# Patient Record
Sex: FEMALE | Race: WHITE | NOT HISPANIC OR LATINO | Employment: OTHER | URBAN - NONMETROPOLITAN AREA
[De-identification: names, ages, dates, MRNs, and addresses within clinical notes are randomized per-mention and may not be internally consistent; named-entity substitution may affect disease eponyms.]

---

## 2017-12-12 ENCOUNTER — TRANSCRIBE ORDERS (OUTPATIENT)
Dept: PODIATRY | Facility: CLINIC | Age: 42
End: 2017-12-12

## 2017-12-12 DIAGNOSIS — L60.0 INGROWN TOENAIL: Primary | ICD-10-CM

## 2017-12-18 ENCOUNTER — OFFICE VISIT (OUTPATIENT)
Dept: PODIATRY | Facility: CLINIC | Age: 42
End: 2017-12-18

## 2017-12-18 VITALS
OXYGEN SATURATION: 95 % | DIASTOLIC BLOOD PRESSURE: 87 MMHG | HEIGHT: 65 IN | HEART RATE: 79 BPM | WEIGHT: 209 LBS | SYSTOLIC BLOOD PRESSURE: 145 MMHG | BODY MASS INDEX: 34.82 KG/M2

## 2017-12-18 DIAGNOSIS — L60.2 ONYCHOGRYPHOSIS: ICD-10-CM

## 2017-12-18 DIAGNOSIS — M79.675 GREAT TOE PAIN, LEFT: Primary | ICD-10-CM

## 2017-12-18 PROCEDURE — 99203 OFFICE O/P NEW LOW 30 MIN: CPT | Performed by: PODIATRIST

## 2017-12-18 PROCEDURE — 11750 EXCISION NAIL&NAIL MATRIX: CPT | Performed by: PODIATRIST

## 2017-12-18 RX ORDER — SERTRALINE HYDROCHLORIDE 25 MG/1
TABLET, FILM COATED ORAL
Status: ON HOLD | COMMUNITY
Start: 2017-12-12 | End: 2018-10-13

## 2017-12-18 RX ORDER — GABAPENTIN 100 MG/1
150 CAPSULE ORAL 3 TIMES DAILY
COMMUNITY
Start: 2017-12-12

## 2017-12-18 RX ORDER — MULTIPLE VITAMINS W/ MINERALS TAB 9MG-400MCG
1 TAB ORAL DAILY
COMMUNITY

## 2017-12-18 RX ORDER — DANTROLENE SODIUM 50 MG/1
CAPSULE ORAL
COMMUNITY
Start: 2017-12-14

## 2017-12-18 RX ORDER — ROPINIROLE 2 MG/1
TABLET, FILM COATED ORAL
COMMUNITY
Start: 2017-12-13

## 2017-12-18 NOTE — PROGRESS NOTES
Lucy Ross  1975  42 y.o. female     Patient presents today with a complaint of a painful toenail on her left great toe.    2017  Chief Complaint   Patient presents with   • Left Foot - Ingrown Toenail           History of Present Illness    Lucy Ross is a 42 y.o.female who presents to clinic today chief complaint of left great toe pain.  Pain is been present for several months.  It is progressively getting worse.  She rates the pain as a 5 out of 10 at its worst.  She describes it as sharp.  Pain is aggravated with closed shoes.  She has done nothing to treat it.  She denies any injuries or trauma.  She has no other pedal complaints.          Past Medical History:   Diagnosis Date   • Fibromyalgia    • Ingrown toenail    • Seizures          Past Surgical History:   Procedure Laterality Date   •  SECTION     • CHOLECYSTECTOMY     • ENDOMETRIAL ABLATION     • MOUTH SURGERY           Family History   Problem Relation Age of Onset   • Adopted: Yes   • Family history unknown: Yes       Allergies   Allergen Reactions   • Lisinopril    • Prednisone        Social History     Social History   • Marital status:      Spouse name: N/A   • Number of children: N/A   • Years of education: N/A     Occupational History   • Not on file.     Social History Main Topics   • Smoking status: Current Every Day Smoker     Types: Cigarettes, Electronic Cigarette   • Smokeless tobacco: Never Used   • Alcohol use No   • Drug use: Defer   • Sexual activity: Defer     Other Topics Concern   • Not on file     Social History Narrative   • No narrative on file         Current Outpatient Prescriptions   Medication Sig Dispense Refill   • dantrolene (DANTRIUM) 50 MG capsule      • gabapentin (NEURONTIN) 100 MG capsule      • Multiple Vitamins-Minerals (MULTIVITAMIN WITH MINERALS) tablet tablet Take 1 tablet by mouth Daily.     • rOPINIRole (REQUIP) 2 MG tablet      • sertraline (ZOLOFT) 25 MG tablet        No  "current facility-administered medications for this visit.          OBJECTIVE    /87  Pulse 79  Ht 163.8 cm (64.5\")  Wt 94.8 kg (209 lb)  SpO2 95%  BMI 35.32 kg/m2      Review of Systems   Constitutional: Negative.    Eyes: Negative.    Respiratory: Negative.    Cardiovascular: Positive for leg swelling.   Gastrointestinal: Positive for nausea.   Endocrine: Negative.    Genitourinary: Negative.    Musculoskeletal: Positive for arthralgias, back pain and joint swelling.        Left great toenail pain  Joint pain   Skin: Negative.    Allergic/Immunologic: Negative.    Neurological: Positive for dizziness, numbness and headaches.   Hematological: Negative.    Psychiatric/Behavioral: The patient is nervous/anxious.         Depression           Constitutional: well developed, well nourished    HEENT: Normocephalic and atraumatic, normal hearing    Respiratory: Non labored respirations noted    Cardiovascular:    DP/PT pulses palpable    CFT brisk  to all digits  Skin temp is warm to warm from proximal tibia to distal digits  Pedal hair growth present.   No erythema or edema noted     Musculoskeletal:  Muscle strength is 5/5 for all muscle groups tested   ROM of the 1st MTP is full without pain or crepitus   ROM of the ankle joint is full without pain or crepitus        Dermatological:   Left hallux nail is incurvated on the medial and lateral border.  It is thickened.  It is very tender to palpation.  Skin is warm, dry and intact    Webspaces 1-4 bilateral are clean, dry and intact.   No subcutaneous nodules or masses noted      Neurological:   Sensation intact to light touch    DTR intact    Psychiatric: A&O x 3 with normal mood and affect. NAD.         Nail Removal  Date/Time: 12/18/2017 11:20 AM  Performed by: LUNA WOODS  Authorized by: LUNA WOODS   Consent: Verbal consent obtained. Written consent obtained.  Risks and benefits: risks, benefits and alternatives were discussed  Consent given by: " patient  Patient understanding: patient states understanding of the procedure being performed  Patient identity confirmed: verbally with patient  Location: left foot  Location details: left big toe  Anesthesia: digital block    Anesthesia:  Local Anesthetic: lidocaine 2% without epinephrine  Preparation: skin prepped with Betadine  Amount removed: complete (Nail plate was  from underlying nailbed with blunt dissection and removed with a hemostat)  Nail matrix removed: complete (Phenol)  Dressing: antibiotic ointment and dressing applied  Patient tolerance: Patient tolerated the procedure well with no immediate complications              ASSESSMENT AND PLAN    Lucy was seen today for ingrown toenail.    Diagnoses and all orders for this visit:    Great toe pain, left    Onychogryphosis    - Comprehensive foot and ankle exam performed  -  Treatment of gryphotic toenails was discussed with patient, including risks and potential benefits of nail avulsion both temporary and permanent versus simple debridement.  - Patient elected for a total permanent nail avulsion  - Dispensed aftercare instruction sheet  - All questions were answered and the patient is in agreement with the current treatment plan.  - RTC in 2 weeks          This document has been electronically signed by Wan Mckeon DPM on December 18, 2017 11:17 AM     12/18/2017  11:17 AM

## 2018-10-13 ENCOUNTER — APPOINTMENT (OUTPATIENT)
Dept: CT IMAGING | Facility: HOSPITAL | Age: 43
End: 2018-10-13

## 2018-10-13 ENCOUNTER — HOSPITAL ENCOUNTER (INPATIENT)
Facility: HOSPITAL | Age: 43
LOS: 1 days | Discharge: HOME | End: 2018-10-14
Attending: EMERGENCY MEDICINE | Admitting: OBSTETRICS & GYNECOLOGY

## 2018-10-13 ENCOUNTER — ANESTHESIA (OUTPATIENT)
Dept: PERIOP | Facility: HOSPITAL | Age: 43
End: 2018-10-13

## 2018-10-13 ENCOUNTER — ANESTHESIA EVENT (OUTPATIENT)
Dept: PERIOP | Facility: HOSPITAL | Age: 43
End: 2018-10-13

## 2018-10-13 DIAGNOSIS — R10.2 SUPRAPUBIC PAIN: ICD-10-CM

## 2018-10-13 DIAGNOSIS — F17.200 SMOKER: ICD-10-CM

## 2018-10-13 DIAGNOSIS — N83.209 CYST OF OVARY, UNSPECIFIED LATERALITY: Primary | ICD-10-CM

## 2018-10-13 DIAGNOSIS — E66.9 OBESITY, CLASS II, BMI 35-39.9: ICD-10-CM

## 2018-10-13 LAB
ALBUMIN SERPL-MCNC: 4.3 G/DL (ref 3.4–4.8)
ALBUMIN/GLOB SERPL: 1.2 G/DL (ref 1.1–1.8)
ALP SERPL-CCNC: 53 U/L (ref 38–126)
ALT SERPL W P-5'-P-CCNC: 17 U/L (ref 9–52)
ANION GAP SERPL CALCULATED.3IONS-SCNC: 15 MMOL/L (ref 5–15)
AST SERPL-CCNC: 27 U/L (ref 14–36)
BACTERIA UR QL AUTO: ABNORMAL /HPF
BASOPHILS # BLD AUTO: 0.03 10*3/MM3 (ref 0–0.2)
BASOPHILS NFR BLD AUTO: 0.3 % (ref 0–2)
BILIRUB SERPL-MCNC: 0.4 MG/DL (ref 0.2–1.3)
BILIRUB UR QL STRIP: NEGATIVE
BUN BLD-MCNC: 11 MG/DL (ref 7–21)
BUN/CREAT SERPL: 21.2 (ref 7–25)
CALCIUM SPEC-SCNC: 9.3 MG/DL (ref 8.4–10.2)
CHLORIDE SERPL-SCNC: 104 MMOL/L (ref 95–110)
CLARITY UR: ABNORMAL
CO2 SERPL-SCNC: 19 MMOL/L (ref 22–31)
COLOR UR: YELLOW
CREAT BLD-MCNC: 0.52 MG/DL (ref 0.5–1)
DEPRECATED RDW RBC AUTO: 41.2 FL (ref 36.4–46.3)
EOSINOPHIL # BLD AUTO: 0.27 10*3/MM3 (ref 0–0.7)
EOSINOPHIL NFR BLD AUTO: 2.3 % (ref 0–7)
ERYTHROCYTE [DISTWIDTH] IN BLOOD BY AUTOMATED COUNT: 12.2 % (ref 11.5–14.5)
GFR SERPL CREATININE-BSD FRML MDRD: 129 ML/MIN/1.73 (ref 58–135)
GLOBULIN UR ELPH-MCNC: 3.6 GM/DL (ref 2.3–3.5)
GLUCOSE BLD-MCNC: 155 MG/DL (ref 60–100)
GLUCOSE UR STRIP-MCNC: NEGATIVE MG/DL
HCG SERPL QL: NEGATIVE
HCT VFR BLD AUTO: 44.4 % (ref 35–45)
HGB BLD-MCNC: 15.6 G/DL (ref 12–15.5)
HGB UR QL STRIP.AUTO: NEGATIVE
HYALINE CASTS UR QL AUTO: ABNORMAL /LPF
IMM GRANULOCYTES # BLD: 0.06 10*3/MM3 (ref 0–0.02)
IMM GRANULOCYTES NFR BLD: 0.5 % (ref 0–0.5)
KETONES UR QL STRIP: NEGATIVE
LEUKOCYTE ESTERASE UR QL STRIP.AUTO: ABNORMAL
LYMPHOCYTES # BLD AUTO: 1.74 10*3/MM3 (ref 0.6–4.2)
LYMPHOCYTES NFR BLD AUTO: 14.8 % (ref 10–50)
MCH RBC QN AUTO: 32.2 PG (ref 26.5–34)
MCHC RBC AUTO-ENTMCNC: 35.1 G/DL (ref 31.4–36)
MCV RBC AUTO: 91.7 FL (ref 80–98)
MONOCYTES # BLD AUTO: 0.79 10*3/MM3 (ref 0–0.9)
MONOCYTES NFR BLD AUTO: 6.7 % (ref 0–12)
NEUTROPHILS # BLD AUTO: 8.89 10*3/MM3 (ref 2–8.6)
NEUTROPHILS NFR BLD AUTO: 75.4 % (ref 37–80)
NITRITE UR QL STRIP: NEGATIVE
PH UR STRIP.AUTO: <=5 [PH] (ref 5–9)
PLATELET # BLD AUTO: 197 10*3/MM3 (ref 150–450)
PMV BLD AUTO: 10.7 FL (ref 8–12)
POTASSIUM BLD-SCNC: 4 MMOL/L (ref 3.5–5.1)
PROT SERPL-MCNC: 7.9 G/DL (ref 6.3–8.6)
PROT UR QL STRIP: NEGATIVE
RBC # BLD AUTO: 4.84 10*6/MM3 (ref 3.77–5.16)
RBC # UR: ABNORMAL /HPF
REF LAB TEST METHOD: ABNORMAL
SODIUM BLD-SCNC: 138 MMOL/L (ref 137–145)
SP GR UR STRIP: 1.02 (ref 1–1.03)
SQUAMOUS #/AREA URNS HPF: ABNORMAL /HPF
UROBILINOGEN UR QL STRIP: ABNORMAL
WBC NRBC COR # BLD: 11.78 10*3/MM3 (ref 3.2–9.8)
WBC UR QL AUTO: ABNORMAL /HPF
WHOLE BLOOD HOLD SPECIMEN: NORMAL

## 2018-10-13 PROCEDURE — G0378 HOSPITAL OBSERVATION PER HR: HCPCS

## 2018-10-13 PROCEDURE — 25010000002 ESTRADIOL VALERATE PER 10 MG: Performed by: OBSTETRICS & GYNECOLOGY

## 2018-10-13 PROCEDURE — 25010000002 FENTANYL CITRATE (PF) 100 MCG/2ML SOLUTION: Performed by: NURSE ANESTHETIST, CERTIFIED REGISTERED

## 2018-10-13 PROCEDURE — 25010000002 NEOSTIGMINE 4 MG/4ML SOLUTION PREFILLED SYRINGE: Performed by: NURSE ANESTHETIST, CERTIFIED REGISTERED

## 2018-10-13 PROCEDURE — 25010000002 IOPAMIDOL 61 % SOLUTION: Performed by: EMERGENCY MEDICINE

## 2018-10-13 PROCEDURE — 0UT90ZZ RESECTION OF UTERUS, OPEN APPROACH: ICD-10-PCS | Performed by: OBSTETRICS & GYNECOLOGY

## 2018-10-13 PROCEDURE — 88304 TISSUE EXAM BY PATHOLOGIST: CPT | Performed by: PATHOLOGY

## 2018-10-13 PROCEDURE — 58150 TOTAL HYSTERECTOMY: CPT | Performed by: OBSTETRICS & GYNECOLOGY

## 2018-10-13 PROCEDURE — 25010000002 TESTOSTERONE CYPIONATE 200 MG/ML SOLUTION: Performed by: OBSTETRICS & GYNECOLOGY

## 2018-10-13 PROCEDURE — 99223 1ST HOSP IP/OBS HIGH 75: CPT | Performed by: OBSTETRICS & GYNECOLOGY

## 2018-10-13 PROCEDURE — 25010000002 ONDANSETRON PER 1 MG: Performed by: NURSE ANESTHETIST, CERTIFIED REGISTERED

## 2018-10-13 PROCEDURE — 25010000002 METHOCARBAMOL 1000 MG/10ML SOLUTION 10 ML VIAL: Performed by: OBSTETRICS & GYNECOLOGY

## 2018-10-13 PROCEDURE — 85025 COMPLETE CBC W/AUTO DIFF WBC: CPT | Performed by: EMERGENCY MEDICINE

## 2018-10-13 PROCEDURE — 99284 EMERGENCY DEPT VISIT MOD MDM: CPT

## 2018-10-13 PROCEDURE — 25010000002 SUCCINYLCHOLINE PER 20 MG: Performed by: NURSE ANESTHETIST, CERTIFIED REGISTERED

## 2018-10-13 PROCEDURE — 25010000002 HYDROMORPHONE 1 MG/ML SOLUTION: Performed by: ANESTHESIOLOGY

## 2018-10-13 PROCEDURE — 0DTJ0ZZ RESECTION OF APPENDIX, OPEN APPROACH: ICD-10-PCS | Performed by: OBSTETRICS & GYNECOLOGY

## 2018-10-13 PROCEDURE — 81001 URINALYSIS AUTO W/SCOPE: CPT | Performed by: EMERGENCY MEDICINE

## 2018-10-13 PROCEDURE — 25010000002 ONDANSETRON PER 1 MG: Performed by: EMERGENCY MEDICINE

## 2018-10-13 PROCEDURE — 88112 CYTOPATH CELL ENHANCE TECH: CPT | Performed by: PATHOLOGY

## 2018-10-13 PROCEDURE — 88112 CYTOPATH CELL ENHANCE TECH: CPT | Performed by: OBSTETRICS & GYNECOLOGY

## 2018-10-13 PROCEDURE — 88305 TISSUE EXAM BY PATHOLOGIST: CPT | Performed by: OBSTETRICS & GYNECOLOGY

## 2018-10-13 PROCEDURE — 25010000002 DEXAMETHASONE PER 1 MG: Performed by: NURSE ANESTHETIST, CERTIFIED REGISTERED

## 2018-10-13 PROCEDURE — 74177 CT ABD & PELVIS W/CONTRAST: CPT

## 2018-10-13 PROCEDURE — 25010000002 HYDROMORPHONE 1 MG/ML SOLUTION: Performed by: OBSTETRICS & GYNECOLOGY

## 2018-10-13 PROCEDURE — 25010000002 ONDANSETRON PER 1 MG: Performed by: OBSTETRICS & GYNECOLOGY

## 2018-10-13 PROCEDURE — 94799 UNLISTED PULMONARY SVC/PX: CPT

## 2018-10-13 PROCEDURE — 88307 TISSUE EXAM BY PATHOLOGIST: CPT | Performed by: PATHOLOGY

## 2018-10-13 PROCEDURE — 88307 TISSUE EXAM BY PATHOLOGIST: CPT | Performed by: OBSTETRICS & GYNECOLOGY

## 2018-10-13 PROCEDURE — 0UT20ZZ RESECTION OF BILATERAL OVARIES, OPEN APPROACH: ICD-10-PCS | Performed by: OBSTETRICS & GYNECOLOGY

## 2018-10-13 PROCEDURE — 82378 CARCINOEMBRYONIC ANTIGEN: CPT | Performed by: GENERAL PRACTICE

## 2018-10-13 PROCEDURE — 25010000002 PROPOFOL 10 MG/ML EMULSION: Performed by: NURSE ANESTHETIST, CERTIFIED REGISTERED

## 2018-10-13 PROCEDURE — 25010000002 ONDANSETRON PER 1 MG: Performed by: ANESTHESIOLOGY

## 2018-10-13 PROCEDURE — 25010000002 KETOROLAC TROMETHAMINE PER 15 MG: Performed by: OBSTETRICS & GYNECOLOGY

## 2018-10-13 PROCEDURE — 25010000002 HYDROMORPHONE 1 MG/ML SOLUTION

## 2018-10-13 PROCEDURE — 86304 IMMUNOASSAY TUMOR CA 125: CPT | Performed by: GENERAL PRACTICE

## 2018-10-13 PROCEDURE — 25010000002 KETOROLAC TROMETHAMINE PER 15 MG: Performed by: NURSE ANESTHETIST, CERTIFIED REGISTERED

## 2018-10-13 PROCEDURE — 25010000002 MIDAZOLAM PER 1 MG: Performed by: NURSE ANESTHETIST, CERTIFIED REGISTERED

## 2018-10-13 PROCEDURE — 25010000002 ONDANSETRON PER 1 MG: Performed by: GENERAL PRACTICE

## 2018-10-13 PROCEDURE — 84703 CHORIONIC GONADOTROPIN ASSAY: CPT | Performed by: EMERGENCY MEDICINE

## 2018-10-13 PROCEDURE — 0UT70ZZ RESECTION OF BILATERAL FALLOPIAN TUBES, OPEN APPROACH: ICD-10-PCS | Performed by: OBSTETRICS & GYNECOLOGY

## 2018-10-13 PROCEDURE — 88304 TISSUE EXAM BY PATHOLOGIST: CPT | Performed by: OBSTETRICS & GYNECOLOGY

## 2018-10-13 PROCEDURE — 80053 COMPREHEN METABOLIC PANEL: CPT | Performed by: EMERGENCY MEDICINE

## 2018-10-13 RX ORDER — DEXTROSE AND SODIUM CHLORIDE 5; .45 G/100ML; G/100ML
125 INJECTION, SOLUTION INTRAVENOUS CONTINUOUS
Status: DISCONTINUED | OUTPATIENT
Start: 2018-10-13 | End: 2018-10-14 | Stop reason: HOSPADM

## 2018-10-13 RX ORDER — KETOROLAC TROMETHAMINE 30 MG/ML
30 INJECTION, SOLUTION INTRAMUSCULAR; INTRAVENOUS EVERY 6 HOURS
Status: DISCONTINUED | OUTPATIENT
Start: 2018-10-13 | End: 2018-10-14 | Stop reason: HOSPADM

## 2018-10-13 RX ORDER — FLUMAZENIL 0.1 MG/ML
0.2 INJECTION INTRAVENOUS AS NEEDED
Status: CANCELLED | OUTPATIENT
Start: 2018-10-13

## 2018-10-13 RX ORDER — ONDANSETRON 4 MG/1
4 TABLET, FILM COATED ORAL EVERY 6 HOURS PRN
Status: DISCONTINUED | OUTPATIENT
Start: 2018-10-13 | End: 2018-10-14 | Stop reason: HOSPADM

## 2018-10-13 RX ORDER — DIPHENHYDRAMINE HYDROCHLORIDE 50 MG/ML
12.5 INJECTION INTRAMUSCULAR; INTRAVENOUS
Status: DISCONTINUED | OUTPATIENT
Start: 2018-10-13 | End: 2018-10-13 | Stop reason: HOSPADM

## 2018-10-13 RX ORDER — LABETALOL HYDROCHLORIDE 5 MG/ML
5 INJECTION, SOLUTION INTRAVENOUS
Status: CANCELLED | OUTPATIENT
Start: 2018-10-13

## 2018-10-13 RX ORDER — ACETAMINOPHEN 650 MG/1
650 SUPPOSITORY RECTAL ONCE AS NEEDED
Status: CANCELLED | OUTPATIENT
Start: 2018-10-13

## 2018-10-13 RX ORDER — SODIUM CHLORIDE, SODIUM LACTATE, POTASSIUM CHLORIDE, CALCIUM CHLORIDE 600; 310; 30; 20 MG/100ML; MG/100ML; MG/100ML; MG/100ML
125 INJECTION, SOLUTION INTRAVENOUS CONTINUOUS
Status: DISCONTINUED | OUTPATIENT
Start: 2018-10-13 | End: 2018-10-13 | Stop reason: HOSPADM

## 2018-10-13 RX ORDER — CELECOXIB 200 MG/1
400 CAPSULE ORAL ONCE
Status: COMPLETED | OUTPATIENT
Start: 2018-10-13 | End: 2018-10-13

## 2018-10-13 RX ORDER — BISACODYL 10 MG
10 SUPPOSITORY, RECTAL RECTAL DAILY PRN
Status: DISCONTINUED | OUTPATIENT
Start: 2018-10-13 | End: 2018-10-14 | Stop reason: HOSPADM

## 2018-10-13 RX ORDER — NICOTINE 21 MG/24HR
1 PATCH, TRANSDERMAL 24 HOURS TRANSDERMAL EVERY 24 HOURS
Qty: 30 EACH | Refills: 12 | Status: SHIPPED | OUTPATIENT
Start: 2018-10-13

## 2018-10-13 RX ORDER — IBUPROFEN 800 MG/1
800 TABLET ORAL
Status: DISCONTINUED | OUTPATIENT
Start: 2018-10-14 | End: 2018-10-14 | Stop reason: HOSPADM

## 2018-10-13 RX ORDER — ACETAMINOPHEN 325 MG/1
650 TABLET ORAL ONCE AS NEEDED
Status: DISCONTINUED | OUTPATIENT
Start: 2018-10-13 | End: 2018-10-13 | Stop reason: HOSPADM

## 2018-10-13 RX ORDER — ONDANSETRON 2 MG/ML
4 INJECTION INTRAMUSCULAR; INTRAVENOUS EVERY 6 HOURS PRN
Status: DISCONTINUED | OUTPATIENT
Start: 2018-10-13 | End: 2018-10-14 | Stop reason: HOSPADM

## 2018-10-13 RX ORDER — DEXAMETHASONE SODIUM PHOSPHATE 4 MG/ML
INJECTION, SOLUTION INTRA-ARTICULAR; INTRALESIONAL; INTRAMUSCULAR; INTRAVENOUS; SOFT TISSUE AS NEEDED
Status: DISCONTINUED | OUTPATIENT
Start: 2018-10-13 | End: 2018-10-13 | Stop reason: SURG

## 2018-10-13 RX ORDER — ONDANSETRON 2 MG/ML
8 INJECTION INTRAMUSCULAR; INTRAVENOUS ONCE
Status: DISCONTINUED | OUTPATIENT
Start: 2018-10-13 | End: 2018-10-13 | Stop reason: CLARIF

## 2018-10-13 RX ORDER — HYDROCODONE BITARTRATE AND ACETAMINOPHEN 7.5; 325 MG/1; MG/1
1 TABLET ORAL ONCE
Status: COMPLETED | OUTPATIENT
Start: 2018-10-13 | End: 2018-10-13

## 2018-10-13 RX ORDER — NALOXONE HCL 0.4 MG/ML
0.2 VIAL (ML) INJECTION AS NEEDED
Status: DISCONTINUED | OUTPATIENT
Start: 2018-10-13 | End: 2018-10-13 | Stop reason: HOSPADM

## 2018-10-13 RX ORDER — MIDAZOLAM HYDROCHLORIDE 1 MG/ML
INJECTION INTRAMUSCULAR; INTRAVENOUS AS NEEDED
Status: DISCONTINUED | OUTPATIENT
Start: 2018-10-13 | End: 2018-10-13 | Stop reason: SURG

## 2018-10-13 RX ORDER — SODIUM CHLORIDE 9 MG/ML
INJECTION, SOLUTION INTRAVENOUS CONTINUOUS PRN
Status: DISCONTINUED | OUTPATIENT
Start: 2018-10-13 | End: 2018-10-13 | Stop reason: SURG

## 2018-10-13 RX ORDER — FLUMAZENIL 0.1 MG/ML
0.2 INJECTION INTRAVENOUS AS NEEDED
Status: DISCONTINUED | OUTPATIENT
Start: 2018-10-13 | End: 2018-10-13 | Stop reason: HOSPADM

## 2018-10-13 RX ORDER — SUCCINYLCHOLINE CHLORIDE 20 MG/ML
INJECTION INTRAMUSCULAR; INTRAVENOUS AS NEEDED
Status: DISCONTINUED | OUTPATIENT
Start: 2018-10-13 | End: 2018-10-13 | Stop reason: SURG

## 2018-10-13 RX ORDER — ONDANSETRON 2 MG/ML
INJECTION INTRAMUSCULAR; INTRAVENOUS AS NEEDED
Status: DISCONTINUED | OUTPATIENT
Start: 2018-10-13 | End: 2018-10-13 | Stop reason: SURG

## 2018-10-13 RX ORDER — HYDROCODONE BITARTRATE AND ACETAMINOPHEN 5; 325 MG/1; MG/1
1 TABLET ORAL EVERY 4 HOURS PRN
Qty: 40 TABLET | Refills: 0 | Status: SHIPPED | OUTPATIENT
Start: 2018-10-13

## 2018-10-13 RX ORDER — DOCUSATE SODIUM 100 MG/1
100 CAPSULE, LIQUID FILLED ORAL 2 TIMES DAILY
Status: DISCONTINUED | OUTPATIENT
Start: 2018-10-13 | End: 2018-10-14 | Stop reason: HOSPADM

## 2018-10-13 RX ORDER — NICOTINE 21 MG/24HR
1 PATCH, TRANSDERMAL 24 HOURS TRANSDERMAL
Status: DISCONTINUED | OUTPATIENT
Start: 2018-10-14 | End: 2018-10-14 | Stop reason: HOSPADM

## 2018-10-13 RX ORDER — NICOTINE 21 MG/24HR
1 PATCH, TRANSDERMAL 24 HOURS TRANSDERMAL
Status: DISCONTINUED | OUTPATIENT
Start: 2018-10-13 | End: 2018-10-14 | Stop reason: HOSPADM

## 2018-10-13 RX ORDER — ROCURONIUM BROMIDE 10 MG/ML
INJECTION, SOLUTION INTRAVENOUS AS NEEDED
Status: DISCONTINUED | OUTPATIENT
Start: 2018-10-13 | End: 2018-10-13 | Stop reason: SURG

## 2018-10-13 RX ORDER — ONDANSETRON 4 MG/1
4 TABLET, ORALLY DISINTEGRATING ORAL EVERY 6 HOURS PRN
Status: DISCONTINUED | OUTPATIENT
Start: 2018-10-13 | End: 2018-10-14 | Stop reason: HOSPADM

## 2018-10-13 RX ORDER — LIDOCAINE HYDROCHLORIDE 20 MG/ML
INJECTION, SOLUTION INFILTRATION; PERINEURAL AS NEEDED
Status: DISCONTINUED | OUTPATIENT
Start: 2018-10-13 | End: 2018-10-13 | Stop reason: SURG

## 2018-10-13 RX ORDER — SENNA AND DOCUSATE SODIUM 50; 8.6 MG/1; MG/1
2 TABLET, FILM COATED ORAL 2 TIMES DAILY PRN
Status: DISCONTINUED | OUTPATIENT
Start: 2018-10-13 | End: 2018-10-14 | Stop reason: HOSPADM

## 2018-10-13 RX ORDER — PROMETHAZINE HYDROCHLORIDE 25 MG/1
25 TABLET ORAL EVERY 6 HOURS PRN
Qty: 60 TABLET | Refills: 12 | Status: SHIPPED | OUTPATIENT
Start: 2018-10-13 | End: 2018-10-23 | Stop reason: HOSPADM

## 2018-10-13 RX ORDER — MEPERIDINE HYDROCHLORIDE 50 MG/ML
12.5 INJECTION INTRAMUSCULAR; INTRAVENOUS; SUBCUTANEOUS
Status: DISCONTINUED | OUTPATIENT
Start: 2018-10-13 | End: 2018-10-13 | Stop reason: HOSPADM

## 2018-10-13 RX ORDER — NEOSTIGMINE METHYLSULFATE 4 MG/4 ML
SYRINGE (ML) INTRAVENOUS AS NEEDED
Status: DISCONTINUED | OUTPATIENT
Start: 2018-10-13 | End: 2018-10-13 | Stop reason: SURG

## 2018-10-13 RX ORDER — ESTRADIOL VALERATE 40 MG/ML
20 INJECTION INTRAMUSCULAR ONCE
Status: COMPLETED | OUTPATIENT
Start: 2018-10-13 | End: 2018-10-13

## 2018-10-13 RX ORDER — MEPERIDINE HYDROCHLORIDE 50 MG/ML
12.5 INJECTION INTRAMUSCULAR; INTRAVENOUS; SUBCUTANEOUS
Status: CANCELLED | OUTPATIENT
Start: 2018-10-13 | End: 2018-10-14

## 2018-10-13 RX ORDER — SIMETHICONE 80 MG
80 TABLET,CHEWABLE ORAL 4 TIMES DAILY PRN
Status: DISCONTINUED | OUTPATIENT
Start: 2018-10-13 | End: 2018-10-14 | Stop reason: HOSPADM

## 2018-10-13 RX ORDER — ROPINIROLE 1 MG/1
2 TABLET, FILM COATED ORAL NIGHTLY
Status: DISCONTINUED | OUTPATIENT
Start: 2018-10-13 | End: 2018-10-14 | Stop reason: HOSPADM

## 2018-10-13 RX ORDER — SODIUM CHLORIDE 0.9 % (FLUSH) 0.9 %
1-10 SYRINGE (ML) INJECTION AS NEEDED
Status: DISCONTINUED | OUTPATIENT
Start: 2018-10-13 | End: 2018-10-13 | Stop reason: HOSPADM

## 2018-10-13 RX ORDER — CELECOXIB 200 MG/1
CAPSULE ORAL
Status: DISPENSED
Start: 2018-10-13 | End: 2018-10-14

## 2018-10-13 RX ORDER — ZOLPIDEM TARTRATE 5 MG/1
10 TABLET ORAL NIGHTLY PRN
Status: DISCONTINUED | OUTPATIENT
Start: 2018-10-13 | End: 2018-10-14 | Stop reason: HOSPADM

## 2018-10-13 RX ORDER — HYDROCODONE BITARTRATE AND ACETAMINOPHEN 10; 325 MG/1; MG/1
1 TABLET ORAL EVERY 4 HOURS PRN
Status: DISCONTINUED | OUTPATIENT
Start: 2018-10-13 | End: 2018-10-14 | Stop reason: HOSPADM

## 2018-10-13 RX ORDER — ONDANSETRON 2 MG/ML
4 INJECTION INTRAMUSCULAR; INTRAVENOUS ONCE AS NEEDED
Status: CANCELLED | OUTPATIENT
Start: 2018-10-13

## 2018-10-13 RX ORDER — EPHEDRINE SULFATE 50 MG/ML
5 INJECTION, SOLUTION INTRAVENOUS ONCE AS NEEDED
Status: DISCONTINUED | OUTPATIENT
Start: 2018-10-13 | End: 2018-10-13 | Stop reason: HOSPADM

## 2018-10-13 RX ORDER — GLYCOPYRROLATE 0.2 MG/ML
INJECTION INTRAMUSCULAR; INTRAVENOUS AS NEEDED
Status: DISCONTINUED | OUTPATIENT
Start: 2018-10-13 | End: 2018-10-13 | Stop reason: SURG

## 2018-10-13 RX ORDER — ONDANSETRON HYDROCHLORIDE 8 MG/1
8 TABLET, FILM COATED ORAL EVERY 8 HOURS PRN
Qty: 30 TABLET | Refills: 12 | Status: SHIPPED | OUTPATIENT
Start: 2018-10-13

## 2018-10-13 RX ORDER — ACETAMINOPHEN 650 MG/1
650 SUPPOSITORY RECTAL ONCE AS NEEDED
Status: DISCONTINUED | OUTPATIENT
Start: 2018-10-13 | End: 2018-10-13 | Stop reason: HOSPADM

## 2018-10-13 RX ORDER — EPHEDRINE SULFATE 50 MG/ML
5 INJECTION, SOLUTION INTRAVENOUS ONCE AS NEEDED
Status: CANCELLED | OUTPATIENT
Start: 2018-10-13

## 2018-10-13 RX ORDER — DIPHENHYDRAMINE HYDROCHLORIDE 50 MG/ML
12.5 INJECTION INTRAMUSCULAR; INTRAVENOUS
Status: CANCELLED | OUTPATIENT
Start: 2018-10-13

## 2018-10-13 RX ORDER — ONDANSETRON 2 MG/ML
4 INJECTION INTRAMUSCULAR; INTRAVENOUS ONCE
Status: COMPLETED | OUTPATIENT
Start: 2018-10-13 | End: 2018-10-13

## 2018-10-13 RX ORDER — TESTOSTERONE CYPIONATE 200 MG/ML
100 INJECTION, SOLUTION INTRAMUSCULAR ONCE
Status: COMPLETED | OUTPATIENT
Start: 2018-10-13 | End: 2018-10-13

## 2018-10-13 RX ORDER — IBUPROFEN 800 MG/1
800 TABLET ORAL EVERY 8 HOURS PRN
Qty: 60 TABLET | Refills: 12 | Status: SHIPPED | OUTPATIENT
Start: 2018-10-13

## 2018-10-13 RX ORDER — GABAPENTIN 100 MG/1
200 CAPSULE ORAL 3 TIMES DAILY
Status: DISCONTINUED | OUTPATIENT
Start: 2018-10-13 | End: 2018-10-14 | Stop reason: HOSPADM

## 2018-10-13 RX ORDER — KETOROLAC TROMETHAMINE 30 MG/ML
INJECTION, SOLUTION INTRAMUSCULAR; INTRAVENOUS AS NEEDED
Status: DISCONTINUED | OUTPATIENT
Start: 2018-10-13 | End: 2018-10-13 | Stop reason: SURG

## 2018-10-13 RX ORDER — BUPIVACAINE HCL/0.9 % NACL/PF 0.1 %
2 PLASTIC BAG, INJECTION (ML) EPIDURAL ONCE
Status: COMPLETED | OUTPATIENT
Start: 2018-10-13 | End: 2018-10-13

## 2018-10-13 RX ORDER — METOPROLOL TARTRATE 5 MG/5ML
INJECTION INTRAVENOUS AS NEEDED
Status: DISCONTINUED | OUTPATIENT
Start: 2018-10-13 | End: 2018-10-13 | Stop reason: SURG

## 2018-10-13 RX ORDER — DANTROLENE SODIUM 25 MG/1
50 CAPSULE ORAL DAILY
Status: DISCONTINUED | OUTPATIENT
Start: 2018-10-14 | End: 2018-10-14 | Stop reason: HOSPADM

## 2018-10-13 RX ORDER — BUPIVACAINE HCL/0.9 % NACL/PF 0.1 %
2 PLASTIC BAG, INJECTION (ML) EPIDURAL EVERY 8 HOURS
Status: DISCONTINUED | OUTPATIENT
Start: 2018-10-14 | End: 2018-10-14 | Stop reason: HOSPADM

## 2018-10-13 RX ORDER — PROPOFOL 10 MG/ML
VIAL (ML) INTRAVENOUS AS NEEDED
Status: DISCONTINUED | OUTPATIENT
Start: 2018-10-13 | End: 2018-10-13 | Stop reason: SURG

## 2018-10-13 RX ORDER — CELECOXIB 200 MG/1
CAPSULE ORAL
Status: COMPLETED
Start: 2018-10-13 | End: 2018-10-13

## 2018-10-13 RX ORDER — ONDANSETRON 2 MG/ML
4 INJECTION INTRAMUSCULAR; INTRAVENOUS ONCE AS NEEDED
Status: COMPLETED | OUTPATIENT
Start: 2018-10-13 | End: 2018-10-13

## 2018-10-13 RX ORDER — BUPROPION HYDROCHLORIDE 150 MG/1
150 TABLET, EXTENDED RELEASE ORAL 2 TIMES DAILY
Qty: 60 TABLET | Refills: 11 | Status: SHIPPED | OUTPATIENT
Start: 2018-10-13

## 2018-10-13 RX ORDER — FENTANYL CITRATE 50 UG/ML
INJECTION, SOLUTION INTRAMUSCULAR; INTRAVENOUS AS NEEDED
Status: DISCONTINUED | OUTPATIENT
Start: 2018-10-13 | End: 2018-10-13 | Stop reason: SURG

## 2018-10-13 RX ORDER — NALOXONE HCL 0.4 MG/ML
0.1 VIAL (ML) INJECTION
Status: DISCONTINUED | OUTPATIENT
Start: 2018-10-13 | End: 2018-10-14 | Stop reason: HOSPADM

## 2018-10-13 RX ORDER — NALOXONE HCL 0.4 MG/ML
0.2 VIAL (ML) INJECTION AS NEEDED
Status: CANCELLED | OUTPATIENT
Start: 2018-10-13

## 2018-10-13 RX ORDER — ACETAMINOPHEN 325 MG/1
650 TABLET ORAL ONCE AS NEEDED
Status: CANCELLED | OUTPATIENT
Start: 2018-10-13

## 2018-10-13 RX ORDER — SODIUM CHLORIDE, SODIUM GLUCONATE, SODIUM ACETATE, POTASSIUM CHLORIDE, AND MAGNESIUM CHLORIDE 526; 502; 368; 37; 30 MG/100ML; MG/100ML; MG/100ML; MG/100ML; MG/100ML
INJECTION, SOLUTION INTRAVENOUS CONTINUOUS PRN
Status: DISCONTINUED | OUTPATIENT
Start: 2018-10-13 | End: 2018-10-13 | Stop reason: SURG

## 2018-10-13 RX ORDER — NICOTINE 21 MG/24HR
1 PATCH, TRANSDERMAL 24 HOURS TRANSDERMAL EVERY 24 HOURS
Qty: 39 EACH | Refills: 12 | Status: SHIPPED | OUTPATIENT
Start: 2018-10-13 | End: 2018-10-13

## 2018-10-13 RX ORDER — HYDROCODONE BITARTRATE AND ACETAMINOPHEN 5; 325 MG/1; MG/1
1 TABLET ORAL EVERY 4 HOURS PRN
Status: DISCONTINUED | OUTPATIENT
Start: 2018-10-13 | End: 2018-10-14 | Stop reason: HOSPADM

## 2018-10-13 RX ORDER — SODIUM CHLORIDE 0.9 % (FLUSH) 0.9 %
3 SYRINGE (ML) INJECTION EVERY 12 HOURS SCHEDULED
Status: DISCONTINUED | OUTPATIENT
Start: 2018-10-13 | End: 2018-10-13 | Stop reason: HOSPADM

## 2018-10-13 RX ORDER — ONDANSETRON HCL IN 0.9 % NACL 8 MG/50 ML
8 INTRAVENOUS SOLUTION, PIGGYBACK (ML) INTRAVENOUS ONCE
Status: COMPLETED | OUTPATIENT
Start: 2018-10-13 | End: 2018-10-13

## 2018-10-13 RX ORDER — LABETALOL HYDROCHLORIDE 5 MG/ML
5 INJECTION, SOLUTION INTRAVENOUS
Status: DISCONTINUED | OUTPATIENT
Start: 2018-10-13 | End: 2018-10-13 | Stop reason: HOSPADM

## 2018-10-13 RX ADMIN — ROCURONIUM BROMIDE 45 MG: 10 INJECTION INTRAVENOUS at 17:37

## 2018-10-13 RX ADMIN — HYDROMORPHONE HYDROCHLORIDE 0.5 MG: 1 INJECTION, SOLUTION INTRAMUSCULAR; INTRAVENOUS; SUBCUTANEOUS at 19:39

## 2018-10-13 RX ADMIN — FENTANYL CITRATE 50 MCG: 50 INJECTION, SOLUTION INTRAMUSCULAR; INTRAVENOUS at 17:57

## 2018-10-13 RX ADMIN — TESTOSTERONE CYPIONATE 100 MG: 200 INJECTION, SOLUTION INTRAMUSCULAR at 22:11

## 2018-10-13 RX ADMIN — SUCCINYLCHOLINE CHLORIDE 200 MG: 20 INJECTION, SOLUTION INTRAMUSCULAR; INTRAVENOUS at 17:29

## 2018-10-13 RX ADMIN — FENTANYL CITRATE 100 MCG: 50 INJECTION, SOLUTION INTRAMUSCULAR; INTRAVENOUS at 17:29

## 2018-10-13 RX ADMIN — CELECOXIB 400 MG: 200 CAPSULE ORAL at 15:13

## 2018-10-13 RX ADMIN — SODIUM CHLORIDE, POTASSIUM CHLORIDE, SODIUM LACTATE AND CALCIUM CHLORIDE: 600; 310; 30; 20 INJECTION, SOLUTION INTRAVENOUS at 17:24

## 2018-10-13 RX ADMIN — SODIUM CHLORIDE: 9 INJECTION, SOLUTION INTRAVENOUS at 18:17

## 2018-10-13 RX ADMIN — ROPINIROLE HYDROCHLORIDE 2 MG: 1 TABLET, FILM COATED ORAL at 22:07

## 2018-10-13 RX ADMIN — ESTRADIOL VALERATE 20 MG: 40 INJECTION INTRAMUSCULAR at 22:12

## 2018-10-13 RX ADMIN — ROCURONIUM BROMIDE 5 MG: 10 INJECTION INTRAVENOUS at 17:29

## 2018-10-13 RX ADMIN — Medication 1 MG: at 15:03

## 2018-10-13 RX ADMIN — METHOCARBAMOL 1000 MG: 100 INJECTION INTRAMUSCULAR; INTRAVENOUS at 21:28

## 2018-10-13 RX ADMIN — HYDROCODONE BITARTRATE AND ACETAMINOPHEN 1 TABLET: 10; 325 TABLET ORAL at 21:15

## 2018-10-13 RX ADMIN — METOPROLOL TARTRATE 2.5 MG: 5 INJECTION INTRAVENOUS at 18:03

## 2018-10-13 RX ADMIN — FENTANYL CITRATE 50 MCG: 50 INJECTION, SOLUTION INTRAMUSCULAR; INTRAVENOUS at 18:47

## 2018-10-13 RX ADMIN — LIDOCAINE HYDROCHLORIDE 100 MG: 20 INJECTION, SOLUTION INFILTRATION; PERINEURAL at 19:18

## 2018-10-13 RX ADMIN — HYDROMORPHONE HYDROCHLORIDE 0.5 MG: 1 INJECTION, SOLUTION INTRAMUSCULAR; INTRAVENOUS; SUBCUTANEOUS at 19:46

## 2018-10-13 RX ADMIN — FENTANYL CITRATE 50 MCG: 50 INJECTION, SOLUTION INTRAMUSCULAR; INTRAVENOUS at 18:02

## 2018-10-13 RX ADMIN — GLYCOPYRROLATE 0.4 MG: 0.2 INJECTION, SOLUTION INTRAMUSCULAR; INTRAVENOUS at 19:10

## 2018-10-13 RX ADMIN — Medication 2 G: at 17:51

## 2018-10-13 RX ADMIN — LIDOCAINE HYDROCHLORIDE 100 MG: 20 INJECTION, SOLUTION INFILTRATION; PERINEURAL at 17:29

## 2018-10-13 RX ADMIN — ONDANSETRON HYDROCHLORIDE 4 MG: 2 INJECTION, SOLUTION INTRAMUSCULAR; INTRAVENOUS at 23:07

## 2018-10-13 RX ADMIN — DOCUSATE SODIUM 100 MG: 100 CAPSULE, LIQUID FILLED ORAL at 22:08

## 2018-10-13 RX ADMIN — HYDROMORPHONE HYDROCHLORIDE 1 MG: 1 INJECTION, SOLUTION INTRAMUSCULAR; INTRAVENOUS; SUBCUTANEOUS at 15:03

## 2018-10-13 RX ADMIN — ONDANSETRON 4 MG: 2 INJECTION INTRAMUSCULAR; INTRAVENOUS at 19:49

## 2018-10-13 RX ADMIN — HYDROCODONE BITARTRATE AND ACETAMINOPHEN 1 TABLET: 7.5; 325 TABLET ORAL at 09:58

## 2018-10-13 RX ADMIN — Medication 3 MG: at 19:10

## 2018-10-13 RX ADMIN — DEXAMETHASONE SODIUM PHOSPHATE 4 MG: 4 INJECTION, SOLUTION INTRAMUSCULAR; INTRAVENOUS at 17:57

## 2018-10-13 RX ADMIN — HYDROMORPHONE HYDROCHLORIDE 0.5 MG: 1 INJECTION, SOLUTION INTRAMUSCULAR; INTRAVENOUS; SUBCUTANEOUS at 19:30

## 2018-10-13 RX ADMIN — GABAPENTIN 200 MG: 100 CAPSULE ORAL at 22:08

## 2018-10-13 RX ADMIN — SODIUM CHLORIDE, POTASSIUM CHLORIDE, SODIUM LACTATE AND CALCIUM CHLORIDE 125 ML/HR: 600; 310; 30; 20 INJECTION, SOLUTION INTRAVENOUS at 14:58

## 2018-10-13 RX ADMIN — KETOROLAC TROMETHAMINE 60 MG: 30 INJECTION, SOLUTION INTRAMUSCULAR at 19:11

## 2018-10-13 RX ADMIN — FENTANYL CITRATE 100 MCG: 50 INJECTION, SOLUTION INTRAMUSCULAR; INTRAVENOUS at 17:38

## 2018-10-13 RX ADMIN — ONDANSETRON 8 MG: 2 SOLUTION INTRAMUSCULAR; INTRAVENOUS at 15:05

## 2018-10-13 RX ADMIN — KETOROLAC TROMETHAMINE 30 MG: 30 INJECTION, SOLUTION INTRAMUSCULAR; INTRAVENOUS at 22:10

## 2018-10-13 RX ADMIN — SODIUM CHLORIDE, SODIUM GLUCONATE, SODIUM ACETATE, POTASSIUM CHLORIDE, AND MAGNESIUM CHLORIDE: 526; 502; 368; 37; 30 INJECTION, SOLUTION INTRAVENOUS at 18:52

## 2018-10-13 RX ADMIN — ONDANSETRON 4 MG: 2 INJECTION INTRAMUSCULAR; INTRAVENOUS at 10:17

## 2018-10-13 RX ADMIN — SODIUM CHLORIDE: 9 INJECTION, SOLUTION INTRAVENOUS at 17:42

## 2018-10-13 RX ADMIN — NICOTINE 1 PATCH: 14 PATCH, EXTENDED RELEASE TRANSDERMAL at 12:40

## 2018-10-13 RX ADMIN — HYDROMORPHONE HYDROCHLORIDE 1 MG: 1 INJECTION, SOLUTION INTRAMUSCULAR; INTRAVENOUS; SUBCUTANEOUS at 23:07

## 2018-10-13 RX ADMIN — PROPOFOL 200 MG: 10 INJECTION, EMULSION INTRAVENOUS at 17:29

## 2018-10-13 RX ADMIN — IOPAMIDOL 93 ML: 612 INJECTION, SOLUTION INTRAVENOUS at 10:54

## 2018-10-13 RX ADMIN — HYDROMORPHONE HYDROCHLORIDE 0.5 MG: 1 INJECTION, SOLUTION INTRAMUSCULAR; INTRAVENOUS; SUBCUTANEOUS at 19:57

## 2018-10-13 RX ADMIN — ONDANSETRON 4 MG: 2 INJECTION INTRAMUSCULAR; INTRAVENOUS at 18:59

## 2018-10-13 RX ADMIN — MIDAZOLAM HYDROCHLORIDE 2 MG: 2 INJECTION, SOLUTION INTRAMUSCULAR; INTRAVENOUS at 17:04

## 2018-10-13 NOTE — H&P
[facility]  History and Physical    Chief Complaint   Patient presents with   • Flank Pain     left       Subjective     Patient is a 43 y.o.   who presents with sudden onset LLQ pain since 0730 this AM. She reports intermittent pelvic pressure and pain x 2 months. She reports associated nausea and bloating. She denies changes in urinary or bowel function. She denies recent changes in weight.     Her previous obstetric/gynecological history significant for endometrial ablation and previous  section with vertical skin incision.    The following portions of the patients history were reviewed and updated as appropriate: current medications, allergies, past medical history, past surgical history, past family history, past social history and problem list .      ALLERGIES:     Allergies   Allergen Reactions   • Lisinopril         Home Medications:     Prior to Admission medications    Medication Sig Start Date End Date Taking? Authorizing Provider   dantrolene (DANTRIUM) 50 MG capsule  17  Yes Erlin Hutson MD   gabapentin (NEURONTIN) 100 MG capsule Take 150 mg by mouth 3 (Three) Times a Day. 17  Yes Erlin Hutson MD   Multiple Vitamins-Minerals (MULTIVITAMIN WITH MINERALS) tablet tablet Take 1 tablet by mouth Daily.   Yes Erlin Hutson MD   rOPINIRole (REQUIP) 2 MG tablet  17  Yes Erlin Hutson MD   sertraline (ZOLOFT) 25 MG tablet  17  Yes Erlin Hutson MD       Past Medical History: Past Medical History:   Diagnosis Date   • Fibromyalgia    • Ingrown toenail    • Seizures (CMS/HCC)       Past Surgical History Past Surgical History:   Procedure Laterality Date   •  SECTION     • CHOLECYSTECTOMY     • ENDOMETRIAL ABLATION     • MOUTH SURGERY        Family History: Family History   Problem Relation Age of Onset   • Adopted: Yes   • Family history unknown: Yes      Social History:  reports that she has been smoking Cigarettes and  Electronic Cigarette.  She has been smoking about 1.00 pack per day. She has never used smokeless tobacco.   reports that she does not drink alcohol.  Drug use questions deferred to the physician.     Review of Systems     General ROS: The following systems were reviewed and negative;  constitution and eyes    Review of Systems - History obtained from the patient  General ROS: negative for - chills, fatigue, fever or hot flashes  Hematological and Lymphatic ROS: negative for - bleeding problems, blood clots, blood transfusions, bruising or weight loss  Endocrine ROS: negative for - breast changes, hot flashes, palpitations, skin changes or unexpected weight changes  Breast ROS: negative for - galactorrhea, new or changing breast lumps or nipple discharge  Respiratory ROS: negative for - cough, shortness of breath, tachypnea or wheezing  Cardiovascular ROS: negative for - chest pain, dyspnea on exertion or irregular heartbeat  Gastrointestinal ROS: negative for - constipation, diarrhea or stool incontinence  Genito-Urinary ROS: negative for - change in menstrual cycle, dysmenorrhea, dysuria or genital discharge  Musculoskeletal ROS: positive for - muscle pain and muscular weakness  negative for - gait disturbance  Neurological ROS: positive for - numbness/tingling  Dermatological ROS: negative for dry skin, pruritus and rash      Objective       Vital Signs Range for the last 24 hours  Temperature: Temp:  [97.8 °F (36.6 °C)-98.2 °F (36.8 °C)] 98.2 °F (36.8 °C)   Temp Source: Temp src: Oral   BP: BP: (118-175)/(60-91) 133/91   Pulse: Heart Rate:  [] 89   Respirations: Resp:  [16-18] 16   SPO2: SpO2:  [96 %-100 %] 98 %   O2 Amount (l/min):     O2 Devices Device (Oxygen Therapy): room air   Weight: Weight:  [95.3 kg (210 lb)] 95.3 kg (210 lb)       OBGyn Exam  Physical Examination: General appearance - alert, well appearing, and in no distress  Mental status - alert, oriented to person, place, and time  Neck -  supple, no significant adenopathy  Chest - clear to auscultation, no wheezes, rales or rhonchi, symmetric air entry  Heart - normal rate, regular rhythm, normal S1, S2, no murmurs, rubs, clicks or gallops  Abdomen - obese, soft, diffuse mild tenderness to palpation, Large palpable abdominal mass   Extremities - peripheral pulses normal, no pedal edema, no clubbing or cyanosis    She was consented for EXPLORATORY LAPAROTOMY AND ABDOMINAL HYSTERECTOMY WITH BILATERAL SALPINGO-OPHORECTOMY AND POSSIBLE APPENDECTOMY.  CELL WASHINGS AND POSSIBLE STAGING.  We discussed the risk of no surgery to include no improvement and possible, although unlikely, undiagnosed malignancy.  The risks that were discussed included, but were not limited to:    1. Bleeding with possible risk of blood transfusion. Blood products carry risk of HIV, infection, hepatitis, and transfusion reaction.    2. Infection requiring a antibiotic therapy.    3. Damage to internal organs such as bowel, bladder, blood vessels, or a hole(fistula) bladder and vagina or rectum and vagina requiring further surgical repair.    4. Anesthetic risk to include death.    5. Intolerance to hormone replacement therapy with risk of osteoporosis and cardiovascular disease.    6. Risk of postsurgical depression or sexual dysfunction after removal of pelvic organs.    7. Small risk for deep vein thrombosis were all explained.     8. The small risk for reoperation in the event of unanticipated bleeding or surgical injury was discussed.     9. Risk of continued pain.   10. Risk of malignancy with possible need for further surgery and/or therapy.  11. Death.    The patient voices understanding that her morbid obesity as well as other medical problems increase the risk of complications from anesthesia, surgery, and postoperative complications.  All of her questions were answered fully. She left with a very clear understanding of the preoperative surgical indications and the nature  of the surgery for which she is scheduled. She understands to be NPO until time of surgery.           Assessment:  1. 42yo  with large 19cm pelvic mass with associated sudden onset abdominal pain.   2. Obesity  3. Tobacco use disorder  4. Fibromyalgia  5. Mixed connective tissue disorder      Plan:  1. Admit to 7th floor  2. NPO  3. Plan EXLAP/AYDEN/BSO/possible appendectomy/cytology/possible staging today  4. Celebrex 400mg preop  5. IV pain meds prn  6. Plan of care has been reviewed with staff, patient, nursing staff.  7. Risks, benefits of treatment plan have been discussed.  8. All questions have been answered.            This document has been electronically signed by Sam Marie MD on 2018 2:55 PM

## 2018-10-13 NOTE — ED NOTES
IV attempt x1 LAC unsuccessful, dressing applied.      Mariola Aguirre, LPN  10/13/18 0938    
Patient not in room at this time     Mariola Aguirre, LPN  10/13/18 5931    
English

## 2018-10-13 NOTE — ANESTHESIA PREPROCEDURE EVALUATION
Anesthesia Evaluation     NPO Solid Status: > 8 hours  NPO Liquid Status: > 8 hours           Airway   Mallampati: II  TM distance: >3 FB  Neck ROM: full  No difficulty expected  Dental    (+) edentulous    Pulmonary - normal exam   (+) a smoker Current Smoked day of surgery,   Cardiovascular - normal exam        Neuro/Psych  (+) seizures well controlled,       ROS Comment: Has not had a seizure in years and patient states that it was never determined if it even was a seizure.  No seizure meds  GI/Hepatic/Renal/Endo      Musculoskeletal     (+) arthralgias,       ROS comment: Fibromyalgia taking dantrolene and neurtontin  Abdominal  - normal exam   Substance History      OB/GYN          Other                        Anesthesia Plan    ASA 3 - emergent     general     intravenous induction   Anesthetic plan, all risks, benefits, and alternatives have been provided, discussed and informed consent has been obtained with: patient and spouse/significant other.    Plan discussed with attending.

## 2018-10-13 NOTE — ANESTHESIA PROCEDURE NOTES
Airway  Urgency: emergent    Airway not difficult    General Information and Staff    Patient location during procedure: OR    Consent for Airway (if performed for an anesthetic, see related documentation for consents)  Patient identity confirmed: verbally with patient  Consent given by: patient      Indications and Patient Condition  Indications for airway management: airway protection    Preoxygenated: yes  MILS maintained throughout  Mask difficulty assessment: 0 - not attempted    Final Airway Details  Final airway type: endotracheal airway      Successful airway: ETT    Successful intubation technique: direct laryngoscopy and RSI  Facilitating devices/methods: intubating stylet and cricoid pressure  Endotracheal tube insertion site: oral  Blade: Tan  Blade size: 3  ETT size: 7.5 mm  Cormack-Lehane Classification: grade IIa - partial view of glottis  Placement verified by: chest auscultation and capnometry   Measured from: lips  ETT to lips (cm): 22  Number of attempts at approach: 1

## 2018-10-13 NOTE — ED PROVIDER NOTES
Subjective   Pt reports she woke up this morning at 7AM with sudden LLQ pain. Associated symptoms include nausea, vomiting, and loss of appetite. No history of kidney stones. Denies urinary symptoms. Last BM was this morning which was normal.         History provided by:  Patient   used: No    Flank Pain   Pain location:  LLQ  Pain quality: sharp    Pain radiates to:  LLQ  Pain severity:  Moderate  Onset quality:  Sudden  Duration:  3 hours  Timing:  Constant  Progression:  Waxing and waning  Chronicity:  New  Context: not eating and not sick contacts    Relieved by:  Lying down  Worsened by:  Movement  Ineffective treatments:  None tried  Associated symptoms: nausea and vomiting (3 episodes this AM)    Associated symptoms: no cough, no diarrhea, no dysuria, no fatigue, no hematochezia, no hematuria and no shortness of breath    Risk factors: obesity        Review of Systems   Constitutional: Negative for fatigue.   HENT: Negative for congestion.    Respiratory: Negative for cough and shortness of breath.    Gastrointestinal: Positive for nausea and vomiting (3 episodes this AM). Negative for diarrhea and hematochezia.   Endocrine: Negative for polyuria.   Genitourinary: Positive for flank pain. Negative for dysuria and hematuria.   Skin: Negative for color change.   Neurological: Negative for syncope.   Hematological: Negative for adenopathy.   Psychiatric/Behavioral: Negative for agitation, behavioral problems and confusion.   All other systems reviewed and are negative.      Past Medical History:   Diagnosis Date   • Fibromyalgia    • Ingrown toenail    • Seizures (CMS/HCC)        Allergies   Allergen Reactions   • Lisinopril        Past Surgical History:   Procedure Laterality Date   •  SECTION     • CHOLECYSTECTOMY     • ENDOMETRIAL ABLATION     • MOUTH SURGERY         Family History   Problem Relation Age of Onset   • Adopted: Yes   • Family history unknown: Yes       Social History      Social History   • Marital status:      Social History Main Topics   • Smoking status: Current Every Day Smoker     Packs/day: 1.00     Types: Cigarettes, Electronic Cigarette   • Smokeless tobacco: Never Used   • Alcohol use No   • Drug use: Unknown   • Sexual activity: Defer     Other Topics Concern   • Not on file           Objective   Physical Exam   Constitutional: She is oriented to person, place, and time. She appears well-developed and well-nourished.   HENT:   Head: Normocephalic.   Right Ear: Hearing normal.   Left Ear: Hearing normal.   Nose: Nose normal.   Eyes: Conjunctivae, EOM and lids are normal.   Neck: Trachea normal and full passive range of motion without pain.   Cardiovascular: Regular rhythm, S1 normal, S2 normal, normal heart sounds and normal pulses.    Pulmonary/Chest: Effort normal and breath sounds normal.   Abdominal: Normal appearance and bowel sounds are normal. There is tenderness in the left lower quadrant. There is CVA tenderness (Left). There is no guarding, no tenderness at McBurney's point and negative Goldstein's sign.   Neurological: She is alert and oriented to person, place, and time. She is not disoriented.   Skin: Skin is warm and dry. She is not diaphoretic.   Psychiatric: She has a normal mood and affect. Her speech is normal and behavior is normal. Thought content normal.   Nursing note and vitals reviewed.      Procedures           Labs Reviewed   URINALYSIS W/ MICROSCOPIC IF INDICATED (NO CULTURE) - Abnormal; Notable for the following:        Result Value    Appearance, UA Cloudy (*)     Leuk Esterase, UA Trace (*)     All other components within normal limits   COMPREHENSIVE METABOLIC PANEL - Abnormal; Notable for the following:     Glucose 155 (*)     CO2 19.0 (*)     Globulin 3.6 (*)     All other components within normal limits   CBC WITH AUTO DIFFERENTIAL - Abnormal; Notable for the following:     WBC 11.78 (*)     Hemoglobin 15.6 (*)     Neutrophils,  Absolute 8.89 (*)     Immature Grans, Absolute 0.06 (*)     All other components within normal limits   URINALYSIS, MICROSCOPIC ONLY - Abnormal; Notable for the following:     RBC, UA 6-12 (*)     WBC, UA 6-12 (*)     Bacteria, UA 1+ (*)     Squamous Epithelial Cells, UA 6-12 (*)     All other components within normal limits   HCG, SERUM, QUALITATIVE - Normal   CBC AND DIFFERENTIAL    Narrative:     The following orders were created for panel order CBC & Differential.  Procedure                               Abnormality         Status                     ---------                               -----------         ------                     CBC Auto Differential[[id number]]        Abnormal            Final result                 Please view results for these tests on the individual orders.   EXTRA TUBES    Narrative:     The following orders were created for panel order Extra Tubes.  Procedure                               Abnormality         Status                     ---------                               -----------         ------                     Light Blue Top[[id number]]                                   Final result                 Please view results for these tests on the individual orders.   LIGHT BLUE TOP       CT Abdomen Pelvis With Contrast   Final Result   1. Large central pelvis cystic structure which measures 19.5 x   12.9 x 16 cm. This lesion abuts bilateral ovaries and may be   arising from either ovary. This lesion would be suspicious for   ovarian serous cystadenoma. The differential would also include   ovarian mucinous tumor versus serous cystadenocarcinoma versus   mature teratoma versus paraovarian cystadenoma.   2. Anterior segment right lobe of liver small simple benign   hepatic cyst..   3. Left kidney upper pole 2 small simple renal cortical cyst..   4. Otherwise unremarkable CT of the abdomen and pelvis..      Electronically signed by:  Gilles Wilson MD  10/13/2018 11:25 AM CDT    Workstation: [id number]            ED Course      10:39AM  Rechecked pt and pt reports feeling significantly better after norco and zofran. [place] on CT results.  All questions addressed at this time.     11:52AM  Discussed with Dr. Marie about pt's condition. Agrees to admit pt to 7th floor to Mother and baby floor.    12:03PM  Rechecked pt and informed pt and spouse results and admission to hospital. They agree with plan and all questions addressed at this time.             MDM      Final diagnoses:   Cyst of ovary, unspecified laterality   Suprapubic pain            Raiza Morales PA-C  10/13/18 9902

## 2018-10-14 VITALS
HEART RATE: 79 BPM | TEMPERATURE: 98.5 F | HEIGHT: 64 IN | WEIGHT: 210 LBS | DIASTOLIC BLOOD PRESSURE: 64 MMHG | BODY MASS INDEX: 35.85 KG/M2 | OXYGEN SATURATION: 95 % | RESPIRATION RATE: 18 BRPM | SYSTOLIC BLOOD PRESSURE: 128 MMHG

## 2018-10-14 LAB
DEPRECATED RDW RBC AUTO: 41.3 FL (ref 36.4–46.3)
ERYTHROCYTE [DISTWIDTH] IN BLOOD BY AUTOMATED COUNT: 12.1 % (ref 11.5–14.5)
HCT VFR BLD AUTO: 39.8 % (ref 35–45)
HGB BLD-MCNC: 13.7 G/DL (ref 12–15.5)
MCH RBC QN AUTO: 31.7 PG (ref 26.5–34)
MCHC RBC AUTO-ENTMCNC: 34.4 G/DL (ref 31.4–36)
MCV RBC AUTO: 92.1 FL (ref 80–98)
PLATELET # BLD AUTO: 213 10*3/MM3 (ref 150–450)
PMV BLD AUTO: 10.9 FL (ref 8–12)
RBC # BLD AUTO: 4.32 10*6/MM3 (ref 3.77–5.16)
WBC NRBC COR # BLD: 14.08 10*3/MM3 (ref 3.2–9.8)

## 2018-10-14 PROCEDURE — 25010000002 KETOROLAC TROMETHAMINE PER 15 MG: Performed by: OBSTETRICS & GYNECOLOGY

## 2018-10-14 PROCEDURE — 25010000002 ONDANSETRON PER 1 MG: Performed by: OBSTETRICS & GYNECOLOGY

## 2018-10-14 PROCEDURE — 25010000002 CEFAZOLIN PER 500 MG: Performed by: OBSTETRICS & GYNECOLOGY

## 2018-10-14 PROCEDURE — 99024 POSTOP FOLLOW-UP VISIT: CPT | Performed by: OBSTETRICS & GYNECOLOGY

## 2018-10-14 PROCEDURE — 25010000002 METHOCARBAMOL 1000 MG/10ML SOLUTION 10 ML VIAL: Performed by: OBSTETRICS & GYNECOLOGY

## 2018-10-14 PROCEDURE — 25010000002 KETOROLAC TROMETHAMINE PER 15 MG: Performed by: GENERAL PRACTICE

## 2018-10-14 PROCEDURE — 85027 COMPLETE CBC AUTOMATED: CPT | Performed by: OBSTETRICS & GYNECOLOGY

## 2018-10-14 RX ORDER — PANTOPRAZOLE SODIUM 40 MG/1
40 TABLET, DELAYED RELEASE ORAL
Status: DISCONTINUED | OUTPATIENT
Start: 2018-10-14 | End: 2018-10-14 | Stop reason: HOSPADM

## 2018-10-14 RX ORDER — KETOROLAC TROMETHAMINE 30 MG/ML
60 INJECTION, SOLUTION INTRAMUSCULAR; INTRAVENOUS ONCE
Status: COMPLETED | OUTPATIENT
Start: 2018-10-14 | End: 2018-10-14

## 2018-10-14 RX ADMIN — ONDANSETRON HYDROCHLORIDE 4 MG: 2 INJECTION, SOLUTION INTRAMUSCULAR; INTRAVENOUS at 04:16

## 2018-10-14 RX ADMIN — KETOROLAC TROMETHAMINE 60 MG: 60 INJECTION, SOLUTION INTRAMUSCULAR at 10:25

## 2018-10-14 RX ADMIN — IBUPROFEN 800 MG: 800 TABLET ORAL at 08:59

## 2018-10-14 RX ADMIN — METHOCARBAMOL 1000 MG: 100 INJECTION INTRAMUSCULAR; INTRAVENOUS at 09:00

## 2018-10-14 RX ADMIN — DOCUSATE SODIUM 100 MG: 100 CAPSULE, LIQUID FILLED ORAL at 08:59

## 2018-10-14 RX ADMIN — METHOCARBAMOL 1000 MG: 100 INJECTION INTRAMUSCULAR; INTRAVENOUS at 03:49

## 2018-10-14 RX ADMIN — NICOTINE 1 PATCH: 21 PATCH TRANSDERMAL at 08:59

## 2018-10-14 RX ADMIN — PANTOPRAZOLE SODIUM 40 MG: 40 TABLET, DELAYED RELEASE ORAL at 08:59

## 2018-10-14 RX ADMIN — HYDROCODONE BITARTRATE AND ACETAMINOPHEN 1 TABLET: 10; 325 TABLET ORAL at 03:16

## 2018-10-14 RX ADMIN — DEXTROSE AND SODIUM CHLORIDE 125 ML/HR: 5; 450 INJECTION, SOLUTION INTRAVENOUS at 01:00

## 2018-10-14 RX ADMIN — KETOROLAC TROMETHAMINE 30 MG: 30 INJECTION, SOLUTION INTRAMUSCULAR; INTRAVENOUS at 03:50

## 2018-10-14 RX ADMIN — SERTRALINE HYDROCHLORIDE 100 MG: 50 TABLET ORAL at 08:59

## 2018-10-14 RX ADMIN — CEFAZOLIN SODIUM 2 G: 10 POWDER, FOR SOLUTION INTRAVENOUS at 10:25

## 2018-10-14 RX ADMIN — KETOROLAC TROMETHAMINE 30 MG: 30 INJECTION, SOLUTION INTRAMUSCULAR; INTRAVENOUS at 09:00

## 2018-10-14 RX ADMIN — DANTROLENE SODIUM 50 MG: 25 CAPSULE ORAL at 09:02

## 2018-10-14 RX ADMIN — GABAPENTIN 200 MG: 100 CAPSULE ORAL at 08:59

## 2018-10-14 NOTE — PLAN OF CARE
Problem: Patient Care Overview  Goal: Plan of Care Review  Outcome: Ongoing (interventions implemented as appropriate)   10/14/18 0036   Coping/Psychosocial   Plan of Care Reviewed With patient   Plan of Care Review   Progress improving   OTHER   Outcome Summary VSS, pain contriolled with narcotics, patient appears comfortable, no drainage visable on dressing, urinary cath patent, iv infusing, patient in no distress      Goal: Individualization and Mutuality  Outcome: Ongoing (interventions implemented as appropriate)    Goal: Discharge Needs Assessment  Outcome: Ongoing (interventions implemented as appropriate)    Goal: Interprofessional Rounds/Family Conf  Outcome: Ongoing (interventions implemented as appropriate)      Problem: Hysterectomy (Adult)  Goal: Signs and Symptoms of Listed Potential Problems Will be Absent, Minimized or Managed (Hysterectomy)  Outcome: Ongoing (interventions implemented as appropriate)    Goal: Anesthesia/Sedation Recovery  Outcome: Ongoing (interventions implemented as appropriate)      Problem: Fall Risk (Adult)  Goal: Identify Related Risk Factors and Signs and Symptoms  Outcome: Ongoing (interventions implemented as appropriate)    Goal: Absence of Fall  Outcome: Ongoing (interventions implemented as appropriate)      Problem: Pain, Acute (Adult)  Goal: Identify Related Risk Factors and Signs and Symptoms  Outcome: Ongoing (interventions implemented as appropriate)    Goal: Acceptable Pain Control/Comfort Level  Outcome: Ongoing (interventions implemented as appropriate)

## 2018-10-14 NOTE — DISCHARGE SUMMARY
[facility]  Lucy Ross  : 1975  MRN: [id number]  CSN: [id number]    Discharge Summary      Date of Admission: 10/13/2018   Date of Discharge: 10/14/2018   Discharge Diagnosis: 1. 20cm Adnexal mass  2. Ovarian Torsion   Procedures Performed: Exploratory Lapartomy, ABDOMINAL HYSTERECTOMY AND BILATERAL SALPINGO OOPHORECTOMY,  APPENDECTOMY.  And CELL WASHINGS.      Brief History: Patient is a 43 y.o.who presented to ED with sudden onset LLQ pain. On CT she was noted to have a 20cm pelvic mass. She was taken to the operating room for exploratory laparotomy. A 20cm fluid filled cystic mass was arising from the left ovary and noted to be torsed x3. A total abdominal hysterectomy, bilateral salpingooophorectomy, appendectomy, and pelvic washings was completed.   Hospital Course: Her hospital course has been uneventful.  Today, on postoperative day # 1, she is tolerating a regular diet, ambulating without assistance, and desires to go home.  Cuello catheter was removed this morning, and she has urinated without difficulty.      She has had no fever, and her pain is controlled.  She has had no nausea or vomiting.   Pending Studies: Tissue Pathology   Condition at discharge:  Diet:  Good  Regular diet   Discharge Activity: Vaginal rest for 6 weeks.  Other activity as tolerated.   Discharge Medications:    Your medication list      START taking these medications      Instructions Last Dose Given Next Dose Due   buPROPion  MG 12 hr tablet  Commonly known as:  WELLBUTRIN SR      Take 1 tablet by mouth 2 (Two) Times a Day.       estradiol 0.1 MG/24HR patch  Commonly known as:  CLIMARA      Place 1 patch on the skin as directed by provider 1 (One) Time Per Week.       HYDROcodone-acetaminophen 5-325 MG per tablet  Commonly known as:  NORCO      Take 1 tablet by mouth Every 4 (Four) Hours As Needed for Severe Pain .       ibuprofen 800 MG tablet  Commonly known as:  ADVIL,MOTRIN      Take 1 tablet by mouth  Every 8 (Eight) Hours As Needed for Mild Pain .       nicotine 21 MG/24HR patch  Commonly known as:  NICODERM CQ      Place 1 patch on the skin as directed by provider Daily.       ondansetron 8 MG tablet  Commonly known as:  ZOFRAN      Take 1 tablet by mouth Every 8 (Eight) Hours As Needed for Nausea or Vomiting.       promethazine 25 MG tablet  Commonly known as:  PHENERGAN      Take 1 tablet by mouth Every 6 (Six) Hours As Needed for Nausea or Vomiting.          CONTINUE taking these medications      Instructions Last Dose Given Next Dose Due   dantrolene 50 MG capsule  Commonly known as:  DANTRIUM           gabapentin 100 MG capsule  Commonly known as:  NEURONTIN      Take 150 mg by mouth 3 (Three) Times a Day.       multivitamin with minerals tablet tablet      Take 1 tablet by mouth Daily.       rOPINIRole 2 MG tablet  Commonly known as:  REQUIP                 Where to Get Your Medications      You can get these medications from any pharmacy    Bring a paper prescription for each of these medications  · buPROPion  MG 12 hr tablet  · estradiol 0.1 MG/24HR patch  · HYDROcodone-acetaminophen 5-325 MG per tablet  · ibuprofen 800 MG tablet  · nicotine 21 MG/24HR patch  · ondansetron 8 MG tablet  · promethazine 25 MG tablet        Discharge Disposition: home   Follow-up: Please call Dr. Emanuel clinic tomorrow to schedule a follow on 10/23/18 to have your staples removed.          This note has been electronically signed.    Miguel Angel Maldonado DO  October 14, 2018  9:02 AM

## 2018-10-14 NOTE — ADDENDUM NOTE
Addendum  created 10/13/18 1925 by Colten Grace MD    Alternative orders not taken and original order placed, Order list changed, Order sets accessed, Sign clinical note

## 2018-10-14 NOTE — ANESTHESIA POSTPROCEDURE EVALUATION
Patient: Lucy Ross    Procedure Summary     Date:  10/13/18 Room / Location:  [facility] OR 09 / [facility] OR    Anesthesia Start:  1710 Anesthesia Stop:  1923    Procedure:  ABDOMINAL HYSTERECTOMY AND BILATERAL SALPINGO OOPHORECTOMY, POSSIBLE APPENDECTOMY.  CELL WASHINGS.  POSSIBLE STAGING. (Bilateral Abdomen) Diagnosis:       Smoker      Obesity, Class II, BMI 35-39.9      Cyst of ovary, unspecified laterality      (Smoker [F17.200])      (Obesity, Class II, BMI 35-39.9 [E66.9])      (Cyst of ovary, unspecified laterality [N83.209])    Surgeon:  Sam Marie MD Provider:  Colten Grace MD    Anesthesia Type:  general ASA Status:  3 - Emergent          Anesthesia Type: general  Last vitals  BP   143/99 (10/13/18 1510)   Temp   98.2 °F (36.8 °C) (10/13/18 1510)   Pulse   95 (10/13/18 1510)   Resp   20 (10/13/18 1510)     SpO2   98 % (10/13/18 1510)     Post Anesthesia Care and Evaluation    Patient location during evaluation: PACU  Patient participation: complete - patient participated  Level of consciousness: awake  Pain score: 1  Pain management: adequate  Airway patency: patent  Anesthetic complications: No anesthetic complications  PONV Status: none  Cardiovascular status: acceptable  Respiratory status: acceptable  Hydration status: acceptable

## 2018-10-14 NOTE — ANESTHESIA POSTPROCEDURE EVALUATION
Patient: Lucy Ross    Procedure Summary     Date:  10/13/18 Room / Location:  [facility] OR 09 / [facility] OR    Anesthesia Start:  1710 Anesthesia Stop:  1923    Procedure:  ABDOMINAL HYSTERECTOMY AND BILATERAL SALPINGO OOPHORECTOMY, POSSIBLE APPENDECTOMY.  CELL WASHINGS.  POSSIBLE STAGING. (Bilateral Abdomen) Diagnosis:       Smoker      Obesity, Class II, BMI 35-39.9      Cyst of ovary, unspecified laterality      (Smoker [F17.200])      (Obesity, Class II, BMI 35-39.9 [E66.9])      (Cyst of ovary, unspecified laterality [N83.209])    Surgeon:  Sam Marie MD Provider:  Colten Grace MD    Anesthesia Type:  general ASA Status:  3 - Emergent          Anesthesia Type: general  Last vitals  BP   143/99 (10/13/18 1510)   Temp   98.2 °F (36.8 °C) (10/13/18 1510)   Pulse   95 (10/13/18 1510)   Resp   20 (10/13/18 1510)     SpO2   98 % (10/13/18 1510)     Post Anesthesia Care and Evaluation    Patient location during evaluation: PACU  Patient participation: complete - patient participated  Level of consciousness: awake and alert  Pain score: 3  Pain management: adequate  Airway patency: patent  Anesthetic complications: No anesthetic complications  PONV Status: none  Cardiovascular status: acceptable  Respiratory status: acceptable  Hydration status: acceptable

## 2018-10-14 NOTE — OP NOTE
OPERATIVE NOTE  Lucy Ross  1975  10/13/2018    PREOP DIAGNOSES:  Smoker [F17.200]  Obesity, Class II, BMI 35-39.9 [E66.9]  Cyst of ovary, unspecified laterality [N83.209]    POSTOP DIAGNOSES:     * Smoker [F17.200]     * Obesity, Class II, BMI 35-39.9 [E66.9]     * Cyst of ovary, unspecified laterality [N83.209]     * Left Ovarian torsion        PROCEDURE:   EXPLORATORY LAPAROTOMY, ABDOMINAL HYSTERECTOMY AND BILATERAL SALPINGO OOPHORECTOMY,  APPENDECTOMY, and Pelvic Washings.    SURGEON: Sam Marie MD, FACOG    RESIDENT SURGEON: Miguel Angel Maldonado DO, PGY3    ASSISTANT: Amy Andres CSA    STAFF:   Circulator: Giselle Blair RN  Scrub Person: Camilla Owens  Assistant: Amy Andres CSA    ANESTHESIA: General    ANESTHESIA STAFF:  Anesthesiologist: Colten Grace MD  CRNA: Nallely Montiel CRNA; Boaz Amaya CRNA  Student Nurse Anesthetist: Sanjuana Costello SRNA     ESTIMATED BLOOD LOSS: 100 ml     COMPLICATIONS: none    FINDINGS: Large 20cm fluid filled mass arising from the left ovary torsed three times. Normal appearing uterus, right tube, and right ovary. No evidence of enlarged pelvic or periaortic lymph nodes. Peritoneal surfaces smooth appearing.     DESCRIPTION OF OPERATION:    The patient was identified and brought to the operating room.  She underwent general endotracheal anesthesia per anesthesia protocol.  Cuello catheter was inserted.  Panniculus was elevated with the Traxi retractor. She was prepped and draped in the usual sterile fashion.  A Time Out was performed.     A midline vertical incision was made to the level of the umbilicus and carried sharply down  to the fascia which was scored in the midline and extended laterally.  Rectus muscles were bluntly  in the midline.  Peritoneal cavity was entered bluntly. Pelvic washings were obtained.The mass was then delivered through the incision and untorsed x3. The The mass was then transected from the left adnexa using  the enseal device to clamp, cauterize, and cut the infundibulopelvic ligament with bipolar device.  Self retaining retractor was inserted, and the bowel was packed.  The uterus was elevated.    The left round ligament was clamped, cauterized, and cut with the bipolar device.  The left ureter was identified.  The bladder was reflected inferiorly.  The left uterine artery was clamped, cauterized, and cut with the bipolar device.  The right round ligament was clamped, cauterized, and cut with the bipolar device.  The right ureter was identified.  The right infundibulopelvic ligament was clamped, cauterized, and cut with the the bipolar device thus removing the right tube and ovary.  The right uterine artery was clamped, cauterized, and cut with the bipolar device.  The bladder was further reflected inferiorly.  Straight clamps were placed on either side of the cervix.  These pedicles were cut and suture ligated.  This was continued to the apex of the vagina was reached.  Clamps were placed under the cervix at the apex of the vagina, and the uterus and cervix were sharply resected.  Vaginal cuff was closed with 0 Vicryl.    The area was copiously irrigated.  There was good hemostasis. The appendix was identified.  The base of the appendix was doubly crossclamped.  The mesosalpinx appendix was clamped, cauterized, and cut with the bipolar device.  The appendix was sharply resected.  The appendiceal stump was closed with 0 Vicryl.  There was good hemostasis. The fascia was closed with Looped 0 PDS in a running fashion.  Subcutaneous tissue was irrigated.  Further hemostasis was obtained with electrocautery.  The skin was closed with staples.  Bandage was applied.  Patient was awakened from general anesthesia and transferred to the recovery room in stable condition.  Sponge, lap, and needle counts were correct.  Estimated blood loss was 100 mL.             This document has been electronically signed by Sam Marie MD  on October 13, 2018 7:31 PM

## 2018-10-14 NOTE — PROGRESS NOTES
Lucy Ross  : 1975  MRN: [id number]  CSN: [id number]    Post-operative Day #1  Subjective   Her pain is well controlled.  She is passing gas and has not had a bowel movement. She is ambulating without difficulty. She is urinating spontaneously. She desires discharge today.      Objective     Min/max vitals past 24 hours:   Temp  Min: 97.7 °F (36.5 °C)  Max: 98.8 °F (37.1 °C)  BP  Min: 110/76  Max: 187/106  Pulse  Min: 82  Max: 101  Pulse  Min: 82  Max: 101        I/O last 3 completed shifts:  In: 2250 [I.V.:2250]  Out: 870 [Urine:870]    General: well developed; well nourished  no acute distress   Abdomen: soft, non-tender; no masses  no umbilical or inginual hernias are present  no hepato-splenomegaly  incision is clean, dry and intact   Pelvic: Not performed   Ext: Calves NT     Lab Results   Component Value Date    WBC 14.08 (H) 10/14/2018    HGB 13.7 10/14/2018    HCT 39.8 10/14/2018    MCV 92.1 10/14/2018     10/14/2018        Assessment   1. 42yo  Post-op Day #1 S/P ExLap, Total abdominal hysterectomy, BSO, and Appy for ovarian torsion of a 20cm left adnexal mass. Vitals WNL. Benign exam. Meeting discharge milestones and desiring discharg.      Plan   1. Discharge to home        This document has been electronically signed by Miguel Angel Maldonado DO on 2018 8:55 AM

## 2018-10-15 LAB — CANCER AG125 SERPL-ACNC: 17.2 U/ML (ref 0–38.1)

## 2018-10-15 NOTE — PAYOR COMM NOTE
"Merry Ross (43 y.o. Female)     Date of Birth Social Security Number Address Home Phone MRN    [id number]  [id number]   [facility] [address] [phone] [id number]    Buddhist Marital Status          None        Admission Date Admission Type Admitting Provider Attending Provider Department, Room/Bed    10/13/18 Emergency Sam Marie MD  [facility] MOTHER BABY, M765/1    Discharge Date Discharge Disposition Discharge Destination        10/14/2018 Home or Self Care              Attending Provider:  (none)   Allergies:  Latex, Lisinopril    Isolation:  None   Infection:  None   Code Status:  Prior    Ht:  162.6 cm (64\")   Wt:  95.3 kg (210 lb)    Admission Cmt:  None   Principal Problem:  None                Active Insurance as of 10/13/2018     Primary Coverage     Payor Plan Insurance Group Employer/Plan Group    ANTHEM BLUE CROSS ANTHEM BLUE CROSS BLUE SHIELD PPO [id number]     Payor Plan Address Payor Plan Phone Number Effective From Effective To    [address] [phone] 1/1/2014     [facility] [id number]       Subscriber Name Subscriber Birth Date Member ID       MILI ROSS 12/22/1966 [id number]           Secondary Coverage     Payor Plan Insurance Group Employer/Plan Group    MEDICARE MEDICARE A & B      Payor Plan Address Payor Plan Phone Number Effective From Effective To    [address] [phone] 6/1/2011     [facility] [id number]       Subscriber Name Subscriber Birth Date Member ID       MERRY ROSS 1975 [id number]                 Emergency Contacts      (Rel.) Home Phone Work Phone Mobile Phone    Mili Ross (Spouse) [phone] -- --        [facility]  P:[phone]  F:[phone]    Ref#[id number]       History & Physical      Sam Marie MD at 10/13/2018  2:29 PM          [facility]  History and Physical    Chief Complaint   Patient presents with   • Flank Pain     left "       Subjective     Patient is a 43 y.o.   who presents with sudden onset LLQ pain since 0730 this AM. She reports intermittent pelvic pressure and pain x 2 months. She reports associated nausea and bloating. She denies changes in urinary or bowel function. She denies recent changes in weight.     Her previous obstetric/gynecological history significant for endometrial ablation and previous  section with vertical skin incision.    The following portions of the patients history were reviewed and updated as appropriate: current medications, allergies, past medical history, past surgical history, past family history, past social history and problem list .      ALLERGIES:     Allergies   Allergen Reactions   • Lisinopril         Home Medications:     Prior to Admission medications    Medication Sig Start Date End Date Taking? Authorizing Provider   dantrolene (DANTRIUM) 50 MG capsule  17  Yes Erlin Hutson MD   gabapentin (NEURONTIN) 100 MG capsule Take 150 mg by mouth 3 (Three) Times a Day. 17  Yes Erlin Hutson MD   Multiple Vitamins-Minerals (MULTIVITAMIN WITH MINERALS) tablet tablet Take 1 tablet by mouth Daily.   Yes Erlin Hutson MD   rOPINIRole (REQUIP) 2 MG tablet  17  Yes Erlin Hutson MD   sertraline (ZOLOFT) 25 MG tablet  17  Yes Erlin Hutson MD       Past Medical History: Past Medical History:   Diagnosis Date   • Fibromyalgia    • Ingrown toenail    • Seizures (CMS/HCC)       Past Surgical History Past Surgical History:   Procedure Laterality Date   •  SECTION     • CHOLECYSTECTOMY     • ENDOMETRIAL ABLATION     • MOUTH SURGERY        Family History: Family History   Problem Relation Age of Onset   • Adopted: Yes   • Family history unknown: Yes      Social History:  reports that she has been smoking Cigarettes and Electronic Cigarette.  She has been smoking about 1.00 pack per day. She has never used smokeless tobacco.    reports that she does not drink alcohol.  Drug use questions deferred to the physician.     Review of Systems     General ROS: The following systems were reviewed and negative;  constitution and eyes    Review of Systems - History obtained from the patient  General ROS: negative for - chills, fatigue, fever or hot flashes  Hematological and Lymphatic ROS: negative for - bleeding problems, blood clots, blood transfusions, bruising or weight loss  Endocrine ROS: negative for - breast changes, hot flashes, palpitations, skin changes or unexpected weight changes  Breast ROS: negative for - galactorrhea, new or changing breast lumps or nipple discharge  Respiratory ROS: negative for - cough, shortness of breath, tachypnea or wheezing  Cardiovascular ROS: negative for - chest pain, dyspnea on exertion or irregular heartbeat  Gastrointestinal ROS: negative for - constipation, diarrhea or stool incontinence  Genito-Urinary ROS: negative for - change in menstrual cycle, dysmenorrhea, dysuria or genital discharge  Musculoskeletal ROS: positive for - muscle pain and muscular weakness  negative for - gait disturbance  Neurological ROS: positive for - numbness/tingling  Dermatological ROS: negative for dry skin, pruritus and rash      Objective       Vital Signs Range for the last 24 hours  Temperature: Temp:  [97.8 °F (36.6 °C)-98.2 °F (36.8 °C)] 98.2 °F (36.8 °C)   Temp Source: Temp src: Oral   BP: BP: (118-175)/(60-91) 133/91   Pulse: Heart Rate:  [] 89   Respirations: Resp:  [16-18] 16   SPO2: SpO2:  [96 %-100 %] 98 %   O2 Amount (l/min):     O2 Devices Device (Oxygen Therapy): room air   Weight: Weight:  [95.3 kg (210 lb)] 95.3 kg (210 lb)       OBGyn Exam  Physical Examination: General appearance - alert, well appearing, and in no distress  Mental status - alert, oriented to person, place, and time  Neck - supple, no significant adenopathy  Chest - clear to auscultation, no wheezes, rales or rhonchi, symmetric air  entry  Heart - normal rate, regular rhythm, normal S1, S2, no murmurs, rubs, clicks or gallops  Abdomen - obese, soft, diffuse mild tenderness to palpation, Large palpable abdominal mass   Extremities - peripheral pulses normal, no pedal edema, no clubbing or cyanosis    She was consented for EXPLORATORY LAPAROTOMY AND ABDOMINAL HYSTERECTOMY WITH BILATERAL SALPINGO-OPHORECTOMY AND POSSIBLE APPENDECTOMY.  CELL WASHINGS AND POSSIBLE STAGING.  We discussed the risk of no surgery to include no improvement and possible, although unlikely, undiagnosed malignancy.  The risks that were discussed included, but were not limited to:    1. Bleeding with possible risk of blood transfusion. Blood products carry risk of HIV, infection, hepatitis, and transfusion reaction.    2. Infection requiring a antibiotic therapy.    3. Damage to internal organs such as bowel, bladder, blood vessels, or a hole(fistula) bladder and vagina or rectum and vagina requiring further surgical repair.    4. Anesthetic risk to include death.    5. Intolerance to hormone replacement therapy with risk of osteoporosis and cardiovascular disease.    6. Risk of postsurgical depression or sexual dysfunction after removal of pelvic organs.    7. Small risk for deep vein thrombosis were all explained.     8. The small risk for reoperation in the event of unanticipated bleeding or surgical injury was discussed.     9. Risk of continued pain.   10. Risk of malignancy with possible need for further surgery and/or therapy.  11. Death.    The patient voices understanding that her morbid obesity as well as other medical problems increase the risk of complications from anesthesia, surgery, and postoperative complications.  All of her questions were answered fully. She left with a very clear understanding of the preoperative surgical indications and the nature of the surgery for which she is scheduled. She understands to be NPO until time of surgery.            Assessment:  1. 44yo  with large 19cm pelvic mass with associated sudden onset abdominal pain.   2. Obesity  3. Tobacco use disorder  4. Fibromyalgia  5. Mixed connective tissue disorder      Plan:  1. Admit to 7th floor  2. NPO  3. Plan EXLAP/AYDEN/BSO/possible appendectomy/cytology/possible staging today  4. Celebrex 400mg preop  5. IV pain meds prn  6. Plan of care has been reviewed with staff, patient, nursing staff.  7. Risks, benefits of treatment plan have been discussed.  8. All questions have been answered.            This document has been electronically signed by Sam Marie MD on 2018 2:55 PM      Electronically signed by Sam Marie MD at 10/13/2018  2:58 PM          Emergency Department Notes      Raiza Morales PA-C at 10/13/2018  9:38 AM     Attestation signed by Tariq Garcia MD at 10/13/2018  4:35 PM          For this patient encounter, I reviewed the NP or PA documentation, treatment plan, and medical decision making. Tariq Garcia MD 10/13/2018 4:35 PM                  Subjective   Pt reports she woke up this morning at 7AM with sudden LLQ pain. Associated symptoms include nausea, vomiting, and loss of appetite. No history of kidney stones. Denies urinary symptoms. Last BM was this morning which was normal.         History provided by:  Patient   used: No    Flank Pain   Pain location:  LLQ  Pain quality: sharp    Pain radiates to:  LLQ  Pain severity:  Moderate  Onset quality:  Sudden  Duration:  3 hours  Timing:  Constant  Progression:  Waxing and waning  Chronicity:  New  Context: not eating and not sick contacts    Relieved by:  Lying down  Worsened by:  Movement  Ineffective treatments:  None tried  Associated symptoms: nausea and vomiting (3 episodes this AM)    Associated symptoms: no cough, no diarrhea, no dysuria, no fatigue, no hematochezia, no hematuria and no shortness of breath    Risk factors: obesity        Review of Systems    Constitutional: Negative for fatigue.   HENT: Negative for congestion.    Respiratory: Negative for cough and shortness of breath.    Gastrointestinal: Positive for nausea and vomiting (3 episodes this AM). Negative for diarrhea and hematochezia.   Endocrine: Negative for polyuria.   Genitourinary: Positive for flank pain. Negative for dysuria and hematuria.   Skin: Negative for color change.   Neurological: Negative for syncope.   Hematological: Negative for adenopathy.   Psychiatric/Behavioral: Negative for agitation, behavioral problems and confusion.   All other systems reviewed and are negative.      Past Medical History:   Diagnosis Date   • Fibromyalgia    • Ingrown toenail    • Seizures (CMS/HCC)        Allergies   Allergen Reactions   • Lisinopril        Past Surgical History:   Procedure Laterality Date   •  SECTION     • CHOLECYSTECTOMY     • ENDOMETRIAL ABLATION     • MOUTH SURGERY         Family History   Problem Relation Age of Onset   • Adopted: Yes   • Family history unknown: Yes       Social History     Social History   • Marital status:      Social History Main Topics   • Smoking status: Current Every Day Smoker     Packs/day: 1.00     Types: Cigarettes, Electronic Cigarette   • Smokeless tobacco: Never Used   • Alcohol use No   • Drug use: Unknown   • Sexual activity: Defer     Other Topics Concern   • Not on file           Objective   Physical Exam   Constitutional: She is oriented to person, place, and time. She appears well-developed and well-nourished.   HENT:   Head: Normocephalic.   Right Ear: Hearing normal.   Left Ear: Hearing normal.   Nose: Nose normal.   Eyes: Conjunctivae, EOM and lids are normal.   Neck: Trachea normal and full passive range of motion without pain.   Cardiovascular: Regular rhythm, S1 normal, S2 normal, normal heart sounds and normal pulses.    Pulmonary/Chest: Effort normal and breath sounds normal.   Abdominal: Normal appearance and bowel sounds are  normal. There is tenderness in the left lower quadrant. There is CVA tenderness (Left). There is no guarding, no tenderness at McBurney's point and negative Goldstein's sign.   Neurological: She is alert and oriented to person, place, and time. She is not disoriented.   Skin: Skin is warm and dry. She is not diaphoretic.   Psychiatric: She has a normal mood and affect. Her speech is normal and behavior is normal. Thought content normal.   Nursing note and vitals reviewed.      Procedures          Labs Reviewed   URINALYSIS W/ MICROSCOPIC IF INDICATED (NO CULTURE) - Abnormal; Notable for the following:        Result Value    Appearance, UA Cloudy (*)     Leuk Esterase, UA Trace (*)     All other components within normal limits   COMPREHENSIVE METABOLIC PANEL - Abnormal; Notable for the following:     Glucose 155 (*)     CO2 19.0 (*)     Globulin 3.6 (*)     All other components within normal limits   CBC WITH AUTO DIFFERENTIAL - Abnormal; Notable for the following:     WBC 11.78 (*)     Hemoglobin 15.6 (*)     Neutrophils, Absolute 8.89 (*)     Immature Grans, Absolute 0.06 (*)     All other components within normal limits   URINALYSIS, MICROSCOPIC ONLY - Abnormal; Notable for the following:     RBC, UA 6-12 (*)     WBC, UA 6-12 (*)     Bacteria, UA 1+ (*)     Squamous Epithelial Cells, UA 6-12 (*)     All other components within normal limits   HCG, SERUM, QUALITATIVE - Normal   CBC AND DIFFERENTIAL    Narrative:     The following orders were created for panel order CBC & Differential.  Procedure                               Abnormality         Status                     ---------                               -----------         ------                     CBC Auto Differential[[id number]]        Abnormal            Final result                 Please view results for these tests on the individual orders.   EXTRA TUBES    Narrative:     The following orders were created for panel order Extra Tubes.  Procedure                                Abnormality         Status                     ---------                               -----------         ------                     Light Blue Top[[id number]]                                   Final result                 Please view results for these tests on the individual orders.   LIGHT BLUE TOP       CT Abdomen Pelvis With Contrast   Final Result   1. Large central pelvis cystic structure which measures 19.5 x   12.9 x 16 cm. This lesion abuts bilateral ovaries and may be   arising from either ovary. This lesion would be suspicious for   ovarian serous cystadenoma. The differential would also include   ovarian mucinous tumor versus serous cystadenocarcinoma versus   mature teratoma versus paraovarian cystadenoma.   2. Anterior segment right lobe of liver small simple benign   hepatic cyst..   3. Left kidney upper pole 2 small simple renal cortical cyst..   4. Otherwise unremarkable CT of the abdomen and pelvis..      Electronically signed by:  Gilles Wilson MD  10/13/2018 11:25 AM CDT   Workstation: [id number]            ED Course      10:39AM  Rechecked pt and pt reports feeling significantly better after norco and zofran. Waitong on CT results.  All questions addressed at this time.     11:52AM  Discussed with Dr. Marie about pt's condition. Agrees to admit pt to 7th floor to Mother and baby floor.    12:03PM  Rechecked pt and informed pt and spouse results and admission to hospital. They agree with plan and all questions addressed at this time.             MDM      Final diagnoses:   Cyst of ovary, unspecified laterality   Suprapubic pain            Raiza Morales PA-C  10/13/18 4736      Electronically signed by Tariq Garcia MD at 10/13/2018  4:35 PM     Mariola Aguirre LPN at 10/13/2018  9:59 AM        IV attempt x1 LAC unsuccessful, dressing applied.      Mariola Aguirre LPN  10/13/18 0902      Electronically signed by Mariola Aguirre LPN at 10/13/2018  9:59 AM     Mariola Aguirre  LPN at 10/13/2018 12:21 PM        Patient not in room at this time     Mariola Aguirre LPN  10/13/18 1221      Electronically signed by Mariola Aguirre LPN at 10/13/2018 12:21 PM       Hospital Medications (all)       Dose Frequency Start End    ceFAZolin in 0.9% normal saline (ANCEF) IVPB solution 2 g 2 g Once 10/13/2018 10/13/2018    Sig - Route: Infuse 100 mL into a venous catheter 1 (One) Time. - Intravenous    celecoxib (CeleBREX) 200 MG capsule  - ADS Override Pull   10/13/2018 10/14/2018    Notes to Pharmacy: ANGELA HENDERSON: cabinet override    celecoxib (CeleBREX) capsule 400 mg 400 mg Once 10/13/2018 10/13/2018    Sig - Route: Take 2 capsules by mouth 1 (One) Time. - Oral    Cosign for Ordering: Accepted by Sam Marie MD on 10/13/2018  2:48 PM    estradiol valerate (DELESTROGEN) injection 20 mg 20 mg Once 10/13/2018 10/13/2018    Sig - Route: Inject 0.5 mL into the appropriate muscle as directed by prescriber 1 (One) Time. - Intramuscular    HYDROcodone-acetaminophen (NORCO) 7.5-325 MG per tablet 1 tablet 1 tablet Once 10/13/2018 10/13/2018    Sig - Route: Take 1 tablet by mouth 1 (One) Time. - Oral    HYDROmorphone (DILAUDID) injection 1 mg 1 mg Once 10/13/2018 10/13/2018    Sig - Route: Infuse 1 mL into a venous catheter 1 (One) Time. - Intravenous    Cosign for Ordering: Accepted by Sam Marie MD on 10/13/2018  2:48 PM    iopamidol (ISOVUE-300) 61 % injection 100 mL 100 mL Once in Imaging 10/13/2018 10/13/2018    Sig - Route: Infuse 100 mL into a venous catheter Once. - Intravenous    ketorolac (TORADOL) injection 60 mg 60 mg Once 10/14/2018 10/14/2018    Sig - Route: Inject 2 mL into the appropriate muscle as directed by prescriber 1 (One) Time. - Intramuscular    Cosign for Ordering: Accepted by Sam Marie MD on 10/14/2018  9:02 AM    Methocarbamol (ROBAXIN) 1,000 mg in sodium chloride 0.9 % 100 mL IVPB 1,000 mg Every 6 Hours 10/13/2018 10/14/2018    Sig - Route: Infuse 1,000 mg into a venous  "catheter Every 6 (Six) Hours. - Intravenous    ondansetron (ZOFRAN) 8 mg/50 mL NS IVPB 8 mg Once 10/13/2018 10/13/2018    Sig - Route: Infuse 50 mL into a venous catheter 1 (One) Time. - Intravenous    ondansetron (ZOFRAN) injection 4 mg 4 mg Once 10/13/2018 10/13/2018    Sig - Route: Infuse 2 mL into a venous catheter 1 (One) Time. - Intravenous    ondansetron (ZOFRAN) injection 4 mg 4 mg Once As Needed 10/13/2018 10/13/2018    Sig - Route: Infuse 2 mL into a venous catheter 1 (One) Time As Needed for Nausea or Vomiting. - Intravenous    Testosterone Cypionate (DEPOTESTOTERONE CYPIONATE) injection 100 mg 100 mg Once 10/13/2018 10/13/2018    Sig - Route: Inject 0.5 mL into the appropriate muscle as directed by prescriber 1 (One) Time. - Intramuscular    acetaminophen (TYLENOL) suppository 650 mg (Discontinued) 650 mg Once As Needed 10/13/2018 10/13/2018    Sig - Route: Insert 1 suppository into the rectum 1 (One) Time As Needed for Mild Pain . - Rectal    Reason for Discontinue: Patient Transfer    Linked Group 1:  \"Or\" Linked Group Details        acetaminophen (TYLENOL) tablet 650 mg (Discontinued) 650 mg Once As Needed 10/13/2018 10/13/2018    Sig - Route: Take 2 tablets by mouth 1 (One) Time As Needed for Mild Pain . - Oral    Reason for Discontinue: Patient Transfer    Linked Group 1:  \"Or\" Linked Group Details        bisacodyl (DULCOLAX) suppository 10 mg (Discontinued) 10 mg Daily PRN 10/13/2018 10/14/2018    Sig - Route: Insert 1 suppository into the rectum Daily As Needed for Constipation. - Rectal    Reason for Discontinue: Patient Discharge    ceFAZolin in 0.9% normal saline (ANCEF) IVPB solution 2 g (Discontinued) 2 g Every 8 Hours 10/14/2018 10/14/2018    Sig - Route: Infuse 100 mL into a venous catheter Every 8 (Eight) Hours. - Intravenous    Reason for Discontinue: Patient Discharge    dantrolene (DANTRIUM) capsule 50 mg (Discontinued) 50 mg Daily 10/14/2018 10/14/2018    Sig - Route: Take 2 capsules " by mouth Daily. - Oral    Reason for Discontinue: Patient Discharge    dextrose 5 % and sodium chloride 0.45 % infusion (Discontinued) 125 mL/hr Continuous 10/13/2018 10/14/2018    Sig - Route: Infuse 125 mL/hr into a venous catheter Continuous. - Intravenous    Reason for Discontinue: Patient Discharge    diphenhydrAMINE (BENADRYL) injection 12.5 mg (Discontinued) 12.5 mg Every 15 Minutes PRN 10/13/2018 10/13/2018    Sig - Route: Infuse 0.25 mL into a venous catheter Every 15 (Fifteen) Minutes As Needed for Itching (May repeat x 1). - Intravenous    Reason for Discontinue: Patient Transfer    docusate sodium (COLACE) capsule 100 mg (Discontinued) 100 mg 2 Times Daily 10/13/2018 10/14/2018    Sig - Route: Take 1 capsule by mouth 2 (Two) Times a Day. - Oral    Reason for Discontinue: Patient Discharge    ePHEDrine injection 5 mg (Discontinued) 5 mg Once As Needed 10/13/2018 10/13/2018    Sig - Route: Infuse 0.1 mL into a venous catheter 1 (One) Time As Needed (symptomatic hypotension - Notify attending anesthesiologist). - Intravenous    Reason for Discontinue: Patient Transfer    flumazenil (ROMAZICON) injection 0.2 mg (Discontinued) 0.2 mg As Needed 10/13/2018 10/13/2018    Sig - Route: Infuse 2 mL into a venous catheter As Needed (unresponsiveness). - Intravenous    Reason for Discontinue: Patient Transfer    gabapentin (NEURONTIN) capsule 200 mg (Discontinued) 200 mg 3 Times Daily 10/13/2018 10/14/2018    Sig - Route: Take 2 capsules by mouth 3 (Three) Times a Day. - Oral    Reason for Discontinue: Patient Discharge    HYDROcodone-acetaminophen (NORCO)  MG per tablet 1 tablet (Discontinued) 1 tablet Every 4 Hours PRN 10/13/2018 10/14/2018    Sig - Route: Take 1 tablet by mouth Every 4 (Four) Hours As Needed for Severe Pain . - Oral    Reason for Discontinue: Patient Discharge    HYDROcodone-acetaminophen (NORCO) 5-325 MG per tablet 1 tablet (Discontinued) 1 tablet Every 4 Hours PRN 10/13/2018 10/14/2018     "Sig - Route: Take 1 tablet by mouth Every 4 (Four) Hours As Needed for Moderate Pain . - Oral    Reason for Discontinue: Patient Discharge    HYDROmorphone (DILAUDID) injection 0.5 mg (Discontinued) 0.5 mg Every 5 Minutes PRN 10/13/2018 10/13/2018    Sig - Route: Infuse 0.5 mL into a venous catheter Every 5 (Five) Minutes As Needed for Moderate Pain  or Severe Pain . - Intravenous    Reason for Discontinue: Patient Transfer    HYDROmorphone (DILAUDID) injection 1 mg (Discontinued) 1 mg Every 4 Hours PRN 10/13/2018 10/14/2018    Sig - Route: Infuse 1 mL into a venous catheter Every 4 (Four) Hours As Needed for Severe Pain . - Intravenous    Reason for Discontinue: Patient Discharge    Linked Group 2:  \"And\" Linked Group Details        ibuprofen (ADVIL,MOTRIN) tablet 800 mg (Discontinued) 800 mg 3 Times Daily With Meals 10/14/2018 10/14/2018    Sig - Route: Take 1 tablet by mouth 3 (Three) Times a Day With Meals. - Oral    Reason for Discontinue: Patient Discharge    ketorolac (TORADOL) injection 30 mg (Discontinued) 30 mg Every 6 Hours 10/13/2018 10/14/2018    Sig - Route: Infuse 1 mL into a venous catheter Every 6 (Six) Hours. - Intravenous    Reason for Discontinue: Patient Discharge    labetalol (NORMODYNE,TRANDATE) injection 5 mg (Discontinued) 5 mg Every 5 Minutes PRN 10/13/2018 10/13/2018    Sig - Route: Infuse 1 mL into a venous catheter Every 5 (Five) Minutes As Needed for High Blood Pressure (for systolic blood pressure greater than 180 mmHg or diastolic blood pressure greater than 105 mmHg). - Intravenous    Reason for Discontinue: Patient Transfer    lactated ringers infusion (Discontinued) 125 mL/hr Continuous 10/13/2018 10/13/2018    Sig - Route: Infuse 125 mL/hr into a venous catheter Continuous. - Intravenous    Reason for Discontinue: Patient Transfer    meperidine (DEMEROL) injection 12.5 mg (Discontinued) 12.5 mg Every 5 Minutes PRN 10/13/2018 10/13/2018    Sig - Route: Infuse 0.25 mL into a venous " "catheter Every 5 (Five) Minutes As Needed for Shivering (May repeat x 8). - Intravenous    Reason for Discontinue: Patient Transfer    naloxone (NARCAN) injection 0.1 mg (Discontinued) 0.1 mg Every 5 Minutes PRN 10/13/2018 10/14/2018    Sig - Route: Infuse 0.25 mL into a venous catheter Every 5 (Five) Minutes As Needed for Respiratory Depression. - Intravenous    Reason for Discontinue: Patient Discharge    Linked Group 2:  \"And\" Linked Group Details        naloxone (NARCAN) injection 0.2 mg (Discontinued) 0.2 mg As Needed 10/13/2018 10/13/2018    Sig - Route: Infuse 0.5 mL into a venous catheter As Needed for Opioid Reversal or Respiratory Depression (unresponsiveness, decrease oxygen saturation). - Intravenous    Reason for Discontinue: Patient Transfer    nicotine (NICODERM CQ) 14 MG/24HR patch 1 patch (Discontinued) 1 patch Every 24 Hours Scheduled 10/13/2018 10/14/2018    Sig - Route: Place 1 patch on the skin as directed by provider Daily. - Transdermal    Reason for Discontinue: Patient Transfer    Cosign for Ordering: Accepted by Tariq Garcia MD on 10/13/2018 12:46 PM    nicotine (NICODERM CQ) 21 MG/24HR patch 1 patch (Discontinued) 1 patch Every 24 Hours Scheduled 10/14/2018 10/14/2018    Sig - Route: Place 1 patch on the skin as directed by provider Daily. - Transdermal    Reason for Discontinue: Patient Discharge    ondansetron (ZOFRAN) injection 4 mg (Discontinued) 4 mg Every 6 Hours PRN 10/13/2018 10/14/2018    Sig - Route: Infuse 2 mL into a venous catheter Every 6 (Six) Hours As Needed for Nausea or Vomiting. - Intravenous    Reason for Discontinue: Patient Discharge    Linked Group 3:  \"Or\" Linked Group Details        ondansetron (ZOFRAN) injection 8 mg (Discontinued) 8 mg Once 10/13/2018 10/13/2018    Sig - Route: Infuse 4 mL into a venous catheter 1 (One) Time. - Intravenous    Reason for Discontinue: Formulary change    Cosign for Ordering: Accepted by Sam Marie MD on 10/13/2018  2:48 PM    " "ondansetron (ZOFRAN) tablet 4 mg (Discontinued) 4 mg Every 6 Hours PRN 10/13/2018 10/14/2018    Sig - Route: Take 1 tablet by mouth Every 6 (Six) Hours As Needed for Nausea or Vomiting. - Oral    Reason for Discontinue: Patient Discharge    Linked Group 3:  \"Or\" Linked Group Details        ondansetron ODT (ZOFRAN-ODT) disintegrating tablet 4 mg (Discontinued) 4 mg Every 6 Hours PRN 10/13/2018 10/14/2018    Sig - Route: Take 1 tablet by mouth Every 6 (Six) Hours As Needed for Nausea or Vomiting. - Oral    Reason for Discontinue: Patient Discharge    Linked Group 3:  \"Or\" Linked Group Details        pantoprazole (PROTONIX) EC tablet 40 mg (Discontinued) 40 mg Every Early Morning 10/14/2018 10/14/2018    Sig - Route: Take 1 tablet by mouth Every Morning. - Oral    Reason for Discontinue: Patient Discharge    Cosign for Ordering: Accepted by Sam Marie MD on 10/14/2018  9:02 AM    polyethylene glycol 3350 powder (packet) (Discontinued) 17 g Daily PRN 10/13/2018 10/14/2018    Sig - Route: Take 17 g by mouth Daily As Needed for Constipation. - Oral    Reason for Discontinue: Patient Discharge    rOPINIRole (REQUIP) tablet 2 mg (Discontinued) 2 mg Nightly 10/13/2018 10/14/2018    Sig - Route: Take 2 tablets by mouth Every Night. - Oral    Reason for Discontinue: Patient Discharge    sennosides-docusate sodium (SENOKOT-S) 8.6-50 MG tablet 2 tablet (Discontinued) 2 tablet 2 Times Daily PRN 10/13/2018 10/14/2018    Sig - Route: Take 2 tablets by mouth 2 (Two) Times a Day As Needed for Constipation. - Oral    Reason for Discontinue: Patient Discharge    sertraline (ZOLOFT) tablet 100 mg (Discontinued) 100 mg Daily 10/14/2018 10/14/2018    Sig - Route: Take 2 tablets by mouth Daily. - Oral    Reason for Discontinue: Patient Discharge    simethicone (MYLICON) chewable tablet 80 mg (Discontinued) 80 mg 4 Times Daily PRN 10/13/2018 10/14/2018    Sig - Route: Chew 1 tablet 4 (Four) Times a Day As Needed for Flatulence. - Oral "    Reason for Discontinue: Patient Discharge    sodium chloride 0.9 % flush 1-10 mL (Discontinued) 1-10 mL As Needed 10/13/2018 10/13/2018    Sig - Route: Infuse 1-10 mL into a venous catheter As Needed for Line Care. - Intravenous    Reason for Discontinue: Patient Transfer    sodium chloride 0.9 % flush 3 mL (Discontinued) 3 mL Every 12 Hours Scheduled 10/13/2018 10/13/2018    Sig - Route: Infuse 3 mL into a venous catheter Every 12 (Twelve) Hours. - Intravenous    Reason for Discontinue: Patient Transfer    zolpidem (AMBIEN) tablet 10 mg (Discontinued) 10 mg Nightly PRN 10/13/2018 10/14/2018    Sig - Route: Take 2 tablets by mouth At Night As Needed for Sleep. - Oral    Reason for Discontinue: Patient Discharge          Lab Results (last 72 hours)     Procedure Component Value Units Date/Time    CBC (No Diff) [[id number]]  (Abnormal) Collected:  10/14/18 0553    Specimen:  Blood Updated:  10/14/18 0630     WBC 14.08 (H) 10*3/mm3      RBC 4.32 10*6/mm3      Hemoglobin 13.7 g/dL      Hematocrit 39.8 %      MCV 92.1 fL      MCH 31.7 pg      MCHC 34.4 g/dL      RDW 12.1 %      RDW-SD 41.3 fl      MPV 10.9 fL      Platelets 213 10*3/mm3      [[id number]] Collected:  10/13/18 1017    Specimen:  Blood Updated:  10/13/18 1434    CEA [[id number]] Collected:  10/13/18 1017    Specimen:  Blood Updated:  10/13/18 1434    Extra Tubes [[id number]] Collected:  10/13/18 1017    Specimen:  Blood from Blood, Venous Line Updated:  10/13/18 1131    Narrative:       The following orders were created for panel order Extra Tubes.  Procedure                               Abnormality         Status                     ---------                               -----------         ------                     Light Blue Top[[id number]]                                   Final result                 Please view results for these tests on the individual orders.    Light Blue Top [[id number]] Collected:  10/13/18 1017    Specimen:  Blood Updated:   10/13/18 1131     Extra Tube hold for add-on     Comment: Auto resulted       hCG, Serum, Qualitative [[id number]]  (Normal) Collected:  10/13/18 1017    Specimen:  Blood Updated:  10/13/18 1040     HCG Qualitative Negative    Comprehensive Metabolic Panel [[id number]]  (Abnormal) Collected:  10/13/18 1017    Specimen:  Blood Updated:  10/13/18 1037     Glucose 155 (H) mg/dL      BUN 11 mg/dL      Creatinine 0.52 mg/dL      Sodium 138 mmol/L      Potassium 4.0 mmol/L      Chloride 104 mmol/L      CO2 19.0 (L) mmol/L      Calcium 9.3 mg/dL      Total Protein 7.9 g/dL      Albumin 4.30 g/dL      ALT (SGPT) 17 U/L      AST (SGOT) 27 U/L      Alkaline Phosphatase 53 U/L      Total Bilirubin 0.4 mg/dL      eGFR Non African Amer 129 mL/min/1.73      Globulin 3.6 (H) gm/dL      A/G Ratio 1.2 g/dL      BUN/Creatinine Ratio 21.2     Anion Gap 15.0 mmol/L     CBC & Differential [[id number]] Collected:  10/13/18 1017    Specimen:  Blood Updated:  10/13/18 1026    Narrative:       The following orders were created for panel order CBC & Differential.  Procedure                               Abnormality         Status                     ---------                               -----------         ------                     CBC Auto Differential[[id number]]        Abnormal            Final result                 Please view results for these tests on the individual orders.    CBC Auto Differential [[id number]]  (Abnormal) Collected:  10/13/18 1017    Specimen:  Blood Updated:  10/13/18 1026     WBC 11.78 (H) 10*3/mm3      RBC 4.84 10*6/mm3      Hemoglobin 15.6 (H) g/dL      Hematocrit 44.4 %      MCV 91.7 fL      MCH 32.2 pg      MCHC 35.1 g/dL      RDW 12.2 %      RDW-SD 41.2 fl      MPV 10.7 fL      Platelets 197 10*3/mm3      Neutrophil % 75.4 %      Lymphocyte % 14.8 %      Monocyte % 6.7 %      Eosinophil % 2.3 %      Basophil % 0.3 %      Immature Grans % 0.5 %      Neutrophils, Absolute 8.89 (H) 10*3/mm3      Lymphocytes,  Absolute 1.74 10*3/mm3      Monocytes, Absolute 0.79 10*3/mm3      Eosinophils, Absolute 0.27 10*3/mm3      Basophils, Absolute 0.03 10*3/mm3      Immature Grans, Absolute 0.06 (H) 10*3/mm3     Urinalysis With Microscopic If Indicated (No Culture) - Urine, Clean Catch [[id number]]  (Abnormal) Collected:  10/13/18 0953    Specimen:  Urine from Urine, Clean Catch Updated:  10/13/18 1003     Color, UA Yellow     Appearance, UA Cloudy (A)     pH, UA <=5.0     Specific Gravity, UA 1.019     Glucose, UA Negative     Ketones, UA Negative     Bilirubin, UA Negative     Blood, UA Negative     Protein, UA Negative     Leuk Esterase, UA Trace (A)     Nitrite, UA Negative     Urobilinogen, UA 0.2 E.U./dL    Urinalysis, Microscopic Only - Urine, Clean Catch [[id number]]  (Abnormal) Collected:  10/13/18 0953    Specimen:  Urine from Urine, Clean Catch Updated:  10/13/18 1003     RBC, UA 6-12 (A) /HPF      WBC, UA 6-12 (A) /HPF      Bacteria, UA 1+ (A) /HPF      Squamous Epithelial Cells, UA 6-12 (A) /HPF      Hyaline Casts, UA 7-12 /LPF      Methodology Automated Microscopy          Imaging Results (last 72 hours)     Procedure Component Value Units Date/Time    CT Abdomen Pelvis With Contrast [[id number]] Collected:  10/13/18 1045     Updated:  10/13/18 1126    Narrative:         PROCEDURE: Ct abdomen and pelvis with contrast    REASON FOR EXAM: LLQ pain    FINDINGS: No comparison. Axial computer tomography sequential  imaging was performed from the diaphragms through the symphysis  pubis with IV contrast administration. Sagittal and coronal  reformates was performed. This exam was performed according to  our departmental dose optimization program, which includes  automated exposure control, adjustment of the mA and/or KV  according to patient size and/or use of iterative reconstruction  technique.     Imaging through lung bases reveals no abnormality.    Imaging of the liver reveals inferior anterior segment right lobe  of liver  small subcentimeter simple hepatic cyst which has  Hounsfield units of 16. Otherwise the liver is unremarkable. The  gallbladder surgically absent. The biliary system appears within  normal limits status post cholecystectomy. The pancreas is  normal. The spleen is normal. Adjacent small inferior accessory  splenic lobule. Bilateral adrenal glands are normal. The right  kidney and ureter are normal. Left kidney upper pole 2 small  simple renal cortical cyst. Otherwise left kidney and ureter are  normal. The bladder is normal. The uterus is normal. Large  central pelvis cystic structure which abuts both the left and  right ovary. This large central pelvic cystic structure measures  19.5 x 12.9 x 16 cm. The hollow viscera is normal. The appendix  is identified appears normal. No lymphadenopathy in the abdomen  or the pelvis. No acute osseous abnormality.      Impression:       1. Large central pelvis cystic structure which measures 19.5 x  12.9 x 16 cm. This lesion abuts bilateral ovaries and may be  arising from either ovary. This lesion would be suspicious for  ovarian serous cystadenoma. The differential would also include  ovarian mucinous tumor versus serous cystadenocarcinoma versus  mature teratoma versus paraovarian cystadenoma.  2. Anterior segment right lobe of liver small simple benign  hepatic cyst..  3. Left kidney upper pole 2 small simple renal cortical cyst..  4. Otherwise unremarkable CT of the abdomen and pelvis..    Electronically signed by:  Gilles Wilson MD  10/13/2018 11:25 AM CDT  Workstation: [id number]          ECG/EMG Results (last 72 hours)     ** No results found for the last 72 hours. **           Operative/Procedure Notes (last 72 hours) (Notes from 10/12/2018 10:41 AM through 10/15/2018 10:41 AM)      Sam Marie MD at 10/13/2018  4:52 PM        OPERATIVE NOTE  Lucy Ross  1975  10/13/2018    PREOP DIAGNOSES:  Smoker [F17.200]  Obesity, Class II, BMI 35-39.9 [E66.9]  Cyst of  ovary, unspecified laterality [N83.209]    POSTOP DIAGNOSES:     * Smoker [F17.200]     * Obesity, Class II, BMI 35-39.9 [E66.9]     * Cyst of ovary, unspecified laterality [N83.209]     * Left Ovarian torsion        PROCEDURE:   EXPLORATORY LAPAROTOMY, ABDOMINAL HYSTERECTOMY AND BILATERAL SALPINGO OOPHORECTOMY,  APPENDECTOMY, and Pelvic Washings.    SURGEON: Sam Marie MD, FACOG    RESIDENT SURGEON: Miguel Angel Maldonado DO, PGY3    ASSISTANT: Amy Andres CSA    STAFF:   Circulator: Giselle Blair RN  Scrub Person: GracielaCamilla  Assistant: Amy Andres CSA    ANESTHESIA: General    ANESTHESIA STAFF:  Anesthesiologist: Colten Grace MD  CRNA: Nallely Montiel CRNA; Boaz Amaya CRNA  Student Nurse Anesthetist: Sanjuana Costello SRNA     ESTIMATED BLOOD LOSS: 100 ml     COMPLICATIONS: none    FINDINGS: Large 20cm fluid filled mass arising from the left ovary torsed three times. Normal appearing uterus, right tube, and right ovary. No evidence of enlarged pelvic or periaortic lymph nodes. Peritoneal surfaces smooth appearing.     DESCRIPTION OF OPERATION:    The patient was identified and brought to the operating room.  She underwent general endotracheal anesthesia per anesthesia protocol.  Cuello catheter was inserted.  Panniculus was elevated with the Traxi retractor. She was prepped and draped in the usual sterile fashion.  A Time Out was performed.     A midline vertical incision was made to the level of the umbilicus and carried sharply down  to the fascia which was scored in the midline and extended laterally.  Rectus muscles were bluntly  in the midline.  Peritoneal cavity was entered bluntly. Pelvic washings were obtained.The mass was then delivered through the incision and untorsed x3. The The mass was then transected from the left adnexa using the enseal device to clamp, cauterize, and cut the infundibulopelvic ligament with bipolar device.  Self retaining retractor was inserted, and  the bowel was packed.  The uterus was elevated.    The left round ligament was clamped, cauterized, and cut with the bipolar device.  The left ureter was identified.  The bladder was reflected inferiorly.  The left uterine artery was clamped, cauterized, and cut with the bipolar device.  The right round ligament was clamped, cauterized, and cut with the bipolar device.  The right ureter was identified.  The right infundibulopelvic ligament was clamped, cauterized, and cut with the the bipolar device thus removing the right tube and ovary.  The right uterine artery was clamped, cauterized, and cut with the bipolar device.  The bladder was further reflected inferiorly.  Straight clamps were placed on either side of the cervix.  These pedicles were cut and suture ligated.  This was continued to the apex of the vagina was reached.  Clamps were placed under the cervix at the apex of the vagina, and the uterus and cervix were sharply resected.  Vaginal cuff was closed with 0 Vicryl.    The area was copiously irrigated.  There was good hemostasis. The appendix was identified.  The base of the appendix was doubly crossclamped.  The mesosalpinx appendix was clamped, cauterized, and cut with the bipolar device.  The appendix was sharply resected.  The appendiceal stump was closed with 0 Vicryl.  There was good hemostasis. The fascia was closed with Looped 0 PDS in a running fashion.  Subcutaneous tissue was irrigated.  Further hemostasis was obtained with electrocautery.  The skin was closed with staples.  Bandage was applied.  Patient was awakened from general anesthesia and transferred to the recovery room in stable condition.  Sponge, lap, and needle counts were correct.  Estimated blood loss was 100 mL.             This document has been electronically signed by Sam Marie MD on October 13, 2018 7:31 PM            Electronically signed by Sam Marie MD at 10/13/2018  7:42 PM          Physician Progress Notes (last  72 hours) (Notes from 10/12/2018 10:41 AM through 10/15/2018 10:41 AM)      Miguel Angel Maldonado DO at 10/14/2018  8:54 AM     Attestation signed by Sam Marie MD at 10/14/2018  9:07 AM    I have seen and evaluated the patient.  I have discussed the case with the resident. I have reviewed the notes, assessment and plan, and/or procedures performed by the resident. I concur with the resident’s documentation.I have reviewed the documentation above and agree.                    Lucy Ross  : 1975  MRN: [id number]  CSN: [id number]    Post-operative Day #1  Subjective   Her pain is well controlled.  She is passing gas and has not had a bowel movement. She is ambulating without difficulty. She is urinating spontaneously. She desires discharge today.     Objective     Min/max vitals past 24 hours:   Temp  Min: 97.7 °F (36.5 °C)  Max: 98.8 °F (37.1 °C)  BP  Min: 110/76  Max: 187/106  Pulse  Min: 82  Max: 101  Pulse  Min: 82  Max: 101        I/O last 3 completed shifts:  In: 2250 [I.V.:2250]  Out: 870 [Urine:870]    General: well developed; well nourished  no acute distress   Abdomen: soft, non-tender; no masses  no umbilical or inginual hernias are present  no hepato-splenomegaly  incision is clean, dry and intact   Pelvic: Not performed   Ext: Calves NT     Lab Results   Component Value Date    WBC 14.08 (H) 10/14/2018    HGB 13.7 10/14/2018    HCT 39.8 10/14/2018    MCV 92.1 10/14/2018     10/14/2018       Assessment   1. 44yo  Post-op Day #1 S/P ExLap, Total abdominal hysterectomy, BSO, and Appy for ovarian torsion of a 20cm left adnexal mass. Vitals WNL. Benign exam. Meeting discharge milestones and desiring discharg.     Plan   1. Discharge to home        This document has been electronically signed by Miguel Angel Maldonado DO on 2018 8:55 AM                Electronically signed by Sam Marie MD at 10/14/2018  9:07 AM       Consult Notes (last 72 hours) (Notes from 10/12/2018 10:41 AM  through 10/15/2018 10:41 AM)     No notes of this type exist for this encounter.

## 2018-10-16 LAB
LAB AP CASE REPORT: NORMAL
PATH REPORT.FINAL DX SPEC: NORMAL
PATH REPORT.GROSS SPEC: NORMAL

## 2018-10-17 LAB
CEA SERPL-MCNC: 3.2 NG/ML (ref 0–5)
CYTO UR: NORMAL
LAB AP CASE REPORT: NORMAL
PATH REPORT.FINAL DX SPEC: NORMAL
PATH REPORT.GROSS SPEC: NORMAL
STAT OF ADQ CVX/VAG CYTO-IMP: NORMAL

## 2018-10-23 ENCOUNTER — OFFICE VISIT (OUTPATIENT)
Dept: OBSTETRICS AND GYNECOLOGY | Facility: CLINIC | Age: 43
End: 2018-10-23

## 2018-10-23 VITALS
HEART RATE: 82 BPM | DIASTOLIC BLOOD PRESSURE: 80 MMHG | BODY MASS INDEX: 37.87 KG/M2 | HEIGHT: 64 IN | WEIGHT: 221.8 LBS | SYSTOLIC BLOOD PRESSURE: 138 MMHG

## 2018-10-23 DIAGNOSIS — Z48.89 POSTOPERATIVE VISIT: ICD-10-CM

## 2018-10-23 DIAGNOSIS — E66.09 CLASS 2 OBESITY DUE TO EXCESS CALORIES WITHOUT SERIOUS COMORBIDITY WITH BODY MASS INDEX (BMI) OF 38.0 TO 38.9 IN ADULT: ICD-10-CM

## 2018-10-23 DIAGNOSIS — Z48.02 ENCOUNTER FOR STAPLE REMOVAL: Primary | ICD-10-CM

## 2018-10-23 DIAGNOSIS — D27.1: ICD-10-CM

## 2018-10-23 PROBLEM — E66.9 CLASS 2 OBESITY IN ADULT: Status: ACTIVE | Noted: 2018-10-23

## 2018-10-23 PROCEDURE — 99024 POSTOP FOLLOW-UP VISIT: CPT | Performed by: OBSTETRICS & GYNECOLOGY

## 2018-10-24 NOTE — PROGRESS NOTES
Lucy Ross is a 43 y.o. y/o female.     Chief Complaint: Postoperative visit and staple removal and a patient that had a torsion of a large adnexal mass    HPI:   43 y.o.        Patient is a 43 y.o.   who presents with sudden onset LLQ pain since 0730 this on the morning of 2018. She reports intermittent pelvic pressure and pain x 2 months. She reports associated nausea and bloating. She denies changes in urinary or bowel function. She denies recent changes in weight.  She was noted to have a large pelvic mass and evidence of ovarian torsion.    2018 (Saturday), she was taken to the operating room for exploratory laparotomy, abdominal hysterectomy, and bilateral salpingo-oophorectomy, appendectomy, and pelvic washings.    1.  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     LEFT UTERINE TUBE:   REMOTE SEGMENTAL SALPINGECTOMY.      2.  APPENDIX:   FIBROUS OBLITERATION OF DISTAL LUMEN.      3.  UTERUS:   ENDOMETRIOSIS, SUBSEROSAL.   UNREMARKABLE CERVIX AND MYOMETRIUM.   OLD ABLATION OF ENDOMETRIUM.      RIGHT OVARY:   FOLLICULAR CYST.      RIGHT UTERINE TUBE:   REMOTE SEGMENTAL SALPINGECTOMY.     2018 she presents for postoperative visit.  She already feels a whole lot better than she did last several years.  Bowels are working well.  No urinary complaints.  No fever.  No vaginal bleeding.  Staples were removed.  She is doing well with estradiol patch.  I'll see her back in about 3 months.  Follow-up sooner as needed.    Review of Systems   Constitutional: Negative for activity change, appetite change, chills, diaphoresis, fatigue, fever and unexpected weight change.   Gastrointestinal: Negative for abdominal pain, constipation, diarrhea and nausea.   Genitourinary: Negative for difficulty urinating, dysuria, pelvic pain, urgency, vaginal bleeding, vaginal discharge and vaginal pain.   Neurological: Negative for headaches.    Psychiatric/Behavioral: Negative for dysphoric mood. The patient is not nervous/anxious.    All other systems reviewed and are negative.     Breast ROS: negative    The following portions of the patient's history were reviewed and updated as appropriate: allergies, current medications, past family history, past medical history, past social history, past surgical history and problem list.    Allergies   Allergen Reactions   • Latex Itching   • Lisinopril         Outpatient Medications Prior to Visit   Medication Sig Dispense Refill   • buPROPion SR (WELLBUTRIN SR) 150 MG 12 hr tablet Take 1 tablet by mouth 2 (Two) Times a Day. 60 tablet 11   • dantrolene (DANTRIUM) 50 MG capsule      • estradiol (CLIMARA) 0.1 MG/24HR patch Place 1 patch on the skin as directed by provider 1 (One) Time Per Week. 4 each 12   • gabapentin (NEURONTIN) 100 MG capsule Take 150 mg by mouth 3 (Three) Times a Day.     • ibuprofen (ADVIL,MOTRIN) 800 MG tablet Take 1 tablet by mouth Every 8 (Eight) Hours As Needed for Mild Pain . 60 tablet 12   • Multiple Vitamins-Minerals (MULTIVITAMIN WITH MINERALS) tablet tablet Take 1 tablet by mouth Daily.     • nicotine (NICODERM CQ) 21 MG/24HR patch Place 1 patch on the skin as directed by provider Daily. 30 each 12   • ondansetron (ZOFRAN) 8 MG tablet Take 1 tablet by mouth Every 8 (Eight) Hours As Needed for Nausea or Vomiting. 30 tablet 12   • rOPINIRole (REQUIP) 2 MG tablet      • HYDROcodone-acetaminophen (NORCO) 5-325 MG per tablet Take 1 tablet by mouth Every 4 (Four) Hours As Needed for Severe Pain . 40 tablet 0   • promethazine (PHENERGAN) 25 MG tablet Take 1 tablet by mouth Every 6 (Six) Hours As Needed for Nausea or Vomiting. 60 tablet 12     No facility-administered medications prior to visit.         The patient has a family history of   Family History   Problem Relation Age of Onset   • Adopted: Yes   • Family history unknown: Yes        Past Medical History:   Diagnosis Date   • Class 2  "obesity in adult 10/23/2018   • Fibromyalgia    • Ingrown toenail    • Obesity, Class II, BMI 35-39.9 10/13/2018   • Seizures (CMS/HCC)         OB History     No data available           Social History     Social History   • Marital status:      Spouse name: N/A   • Number of children: N/A   • Years of education: N/A     Occupational History   • Not on file.     Social History Main Topics   • Smoking status: Current Every Day Smoker     Packs/day: 1.00     Types: Cigarettes, Electronic Cigarette   • Smokeless tobacco: Never Used   • Alcohol use No   • Drug use: Unknown   • Sexual activity: Defer     Other Topics Concern   • Not on file     Social History Narrative   • No narrative on file        Past Surgical History:   Procedure Laterality Date   •  SECTION     • CHOLECYSTECTOMY     • ENDOMETRIAL ABLATION     • MOUTH SURGERY     • TOTAL ABDOMINAL HYSTERECTOMY WITH SALPINGO OOPHORECTOMY Bilateral 10/13/2018    Procedure: ABDOMINAL HYSTERECTOMY AND BILATERAL SALPINGO OOPHORECTOMY,  APPENDECTOMY.  CELL WASHINGS and STAGING.;  Surgeon: Sam Marie MD;  Location: [facility];  Service: Obstetrics/Gynecology        Patient Active Problem List   Diagnosis   • Ovarian cyst   • Obesity, Class II, BMI 35-39.9   • Smoker   • Cyst of ovary   • Serous adenofibroma of left ovary   • Class 2 obesity in adult        Documented Vitals    10/23/18 1046   BP: 138/80   Pulse: 82   Weight: 101 kg (221 lb 12.8 oz)   Height: 162.6 cm (64\")        Body mass index is 38.07 kg/m².    Physical Exam   Constitutional: She is oriented to person, place, and time. No distress.   Obese white female weighing 221.8 pounds with BMI 31.  Blood pressure 130/80.  Pulse 80.   HENT:   Head: Normocephalic and atraumatic.   Eyes: Pupils are equal, round, and reactive to light. Conjunctivae and EOM are normal.   Neck: Normal range of motion. Neck supple. No JVD present. No tracheal deviation present. No thyromegaly present.   Cardiovascular: " Normal rate, regular rhythm, normal heart sounds and intact distal pulses.  Exam reveals no gallop and no friction rub.    No murmur heard.  Pulmonary/Chest: Effort normal and breath sounds normal. No stridor. No respiratory distress. She has no wheezes. She has no rales. She exhibits no tenderness.   Abdominal: Soft. Bowel sounds are normal. She exhibits no distension and no mass. There is no tenderness. There is no rebound and no guarding. No hernia.   Ventral incision.  Staples were removed.  Incision looks good.   Musculoskeletal: Normal range of motion. She exhibits no edema, tenderness or deformity.   Lymphadenopathy:     She has no cervical adenopathy.   Neurological: She is alert and oriented to person, place, and time. She has normal reflexes. She displays normal reflexes. No cranial nerve deficit. She exhibits normal muscle tone. Coordination normal.   Skin: Skin is warm and dry. No rash noted. She is not diaphoretic. No erythema. No pallor.   Psychiatric: She has a normal mood and affect. Her behavior is normal. Judgment and thought content normal.   Nursing note and vitals reviewed.     Assessment        Diagnosis Plan   1. Encounter for staple removal     2. Serous adenofibroma of left ovary     3. Class 2 obesity due to excess calories without serious comorbidity with body mass index (BMI) of 38.0 to 38.9 in adult     4. Postoperative visit           Plan      1. Continue Climara estradiol patch   2. Counseled to quit smoking   3. Encouraged in diet and exercise.  4. Handouts on depression, hot flashes, exercise, and vitamin use.   5. Follow-up in 3 months.  Follow-up sooner as needed.            This document has been electronically signed by Sam Marie MD on October 24, 2018 1:21 AM

## 2019-08-09 ENCOUNTER — HOSPITAL ENCOUNTER (OUTPATIENT)
Dept: ULTRASOUND IMAGING | Facility: HOSPITAL | Age: 44
Discharge: HOME | End: 2019-08-09

## 2019-08-09 ENCOUNTER — HOSPITAL ENCOUNTER (OUTPATIENT)
Dept: MRI IMAGING | Facility: HOSPITAL | Age: 44
Discharge: HOME | End: 2019-08-09
Admitting: PSYCHIATRY & NEUROLOGY

## 2019-08-09 DIAGNOSIS — G45.0 VERTEBROBASILAR INSUFFICIENCY: ICD-10-CM

## 2019-08-09 PROCEDURE — 93880 EXTRACRANIAL BILAT STUDY: CPT

## 2019-08-09 PROCEDURE — A9576 INJ PROHANCE MULTIPACK: HCPCS | Performed by: PSYCHIATRY & NEUROLOGY

## 2019-08-09 PROCEDURE — 25010000002 GADOTERIDOL PER 1 ML: Performed by: PSYCHIATRY & NEUROLOGY

## 2019-08-09 PROCEDURE — 70553 MRI BRAIN STEM W/O & W/DYE: CPT

## 2019-08-09 RX ADMIN — GADOTERIDOL 20 ML: 279.3 INJECTION, SOLUTION INTRAVENOUS at 13:15

## 2019-08-15 ENCOUNTER — HOSPITAL ENCOUNTER (OUTPATIENT)
Dept: MRI IMAGING | Facility: HOSPITAL | Age: 44
Discharge: HOME | End: 2019-08-15
Admitting: PSYCHIATRY & NEUROLOGY

## 2019-08-15 DIAGNOSIS — G45.0 VERTEBRAL ARTERY INSUFFICIENCY: ICD-10-CM

## 2019-08-15 PROCEDURE — A9576 INJ PROHANCE MULTIPACK: HCPCS | Performed by: PSYCHIATRY & NEUROLOGY

## 2019-08-15 PROCEDURE — 25010000002 GADOTERIDOL PER 1 ML: Performed by: PSYCHIATRY & NEUROLOGY

## 2019-08-15 RX ADMIN — GADOTERIDOL 20 ML: 279.3 INJECTION, SOLUTION INTRAVENOUS at 13:00

## 2021-12-22 ENCOUNTER — TRANSCRIBE ORDERS (OUTPATIENT)
Dept: GENERAL RADIOLOGY | Facility: CLINIC | Age: 46
End: 2021-12-22

## 2021-12-22 DIAGNOSIS — M46.1 SACROILIITIS, NOT ELSEWHERE CLASSIFIED (HCC): Primary | ICD-10-CM

## (undated) DEVICE — DRSNG TELFA PAD NONADH STR 1S 3X8IN

## (undated) DEVICE — SUT GUT CHRM 2/0 CT1 36IN 923H

## (undated) DEVICE — SOL ANTISEP SCRB CHG 4PCT 16OZ

## (undated) DEVICE — DRAPE,LAPAROSCOPY,GUSS,STERILE: Brand: MEDLINE

## (undated) DEVICE — GLV SURG SENSICARE PI LF PF 7.5 GRN STRL

## (undated) DEVICE — SOL IRR H2O BTL 1000ML STRL

## (undated) DEVICE — TOTAL TRAY, 16FR 10ML SIL FOLEY, URN: Brand: MEDLINE

## (undated) DEVICE — STERILE POLYISOPRENE POWDER-FREE SURGICAL GLOVES WITH EMOLLIENT COATING: Brand: PROTEXIS

## (undated) DEVICE — SUT VIC 0 CT 36IN J958H

## (undated) DEVICE — GLV SURG NEOLON 2G PF LF 6.5 STRL

## (undated) DEVICE — SOL IRR NACL 0.9PCT BT 1000ML

## (undated) DEVICE — SUT ETHLN 4/0 30IN 626H

## (undated) DEVICE — SPNG LAP 18X18IN LF STRL PK/5

## (undated) DEVICE — ENSEAL 20 CM SHAFT, LARGE JAW: Brand: ENSEAL X1

## (undated) DEVICE — DRP SURG U/BUTT W/PCH BACK STRL

## (undated) DEVICE — GLV SURG SENSICARE PI PF LF 7 GRN STRL

## (undated) DEVICE — STPLR SKIN VISISTAT WD 35CT

## (undated) DEVICE — TRAP,MUCUS SPECIMEN,40CC: Brand: MEDLINE

## (undated) DEVICE — WRAP LEG 31X48X6IN STRL

## (undated) DEVICE — PREP SOL POVIDONE/IODINE BT 4OZ

## (undated) DEVICE — SUT VICRYL O M0-4 27IN ETHCPP71D

## (undated) DEVICE — BNDG ELAS CO-FLEX SLF ADHR 4IN5YD LF STRL

## (undated) DEVICE — SPNG GZ WOVN 4X4IN 12PLY 10/BX STRL

## (undated) DEVICE — PK MAJ PROC LF 60

## (undated) DEVICE — GLV SURG SENSICARE POLYISPRN W/ALOE PF LF 6.5 GRN STRL

## (undated) DEVICE — PAD,ABDOMINAL,8"X10",ST,LF: Brand: MEDLINE

## (undated) DEVICE — TRAXI EXTENDER (BMI>50 - USED WITH PH-25): Brand: TRAXI® PANNICULUS RETRACTOR EXTENDER

## (undated) DEVICE — SUT PDS LP 0 CTX VIO 60IN Z990G